# Patient Record
Sex: MALE | Race: WHITE | Employment: OTHER | URBAN - METROPOLITAN AREA
[De-identification: names, ages, dates, MRNs, and addresses within clinical notes are randomized per-mention and may not be internally consistent; named-entity substitution may affect disease eponyms.]

---

## 2018-11-01 ENCOUNTER — IP HISTORICAL/CONVERTED ENCOUNTER (OUTPATIENT)
Dept: OTHER | Age: 48
End: 2018-11-01

## 2019-03-15 ENCOUNTER — IP HISTORICAL/CONVERTED ENCOUNTER (OUTPATIENT)
Dept: OTHER | Age: 49
End: 2019-03-15

## 2019-08-04 ENCOUNTER — IP HISTORICAL/CONVERTED ENCOUNTER (OUTPATIENT)
Dept: OTHER | Age: 49
End: 2019-08-04

## 2019-10-22 ENCOUNTER — ED HISTORICAL/CONVERTED ENCOUNTER (OUTPATIENT)
Dept: OTHER | Age: 49
End: 2019-10-22

## 2019-12-26 ENCOUNTER — IP HISTORICAL/CONVERTED ENCOUNTER (OUTPATIENT)
Dept: OTHER | Age: 49
End: 2019-12-26

## 2020-01-01 ENCOUNTER — IP HISTORICAL/CONVERTED ENCOUNTER (OUTPATIENT)
Dept: OTHER | Age: 50
End: 2020-01-01

## 2021-01-01 ENCOUNTER — HOSPITAL ENCOUNTER (OUTPATIENT)
Age: 51
Setting detail: OBSERVATION
Discharge: NURSING, REHAB OR LONG-TERM CARE | End: 2021-11-17
Attending: EMERGENCY MEDICINE | Admitting: PSYCHIATRY & NEUROLOGY
Payer: MEDICAID

## 2021-01-01 ENCOUNTER — HOSPITAL ENCOUNTER (INPATIENT)
Age: 51
LOS: 3 days | Discharge: HOME | DRG: 754 | End: 2021-07-09
Attending: EMERGENCY MEDICINE | Admitting: PSYCHIATRY & NEUROLOGY
Payer: MEDICAID

## 2021-01-01 VITALS
WEIGHT: 180 LBS | OXYGEN SATURATION: 100 % | HEART RATE: 64 BPM | SYSTOLIC BLOOD PRESSURE: 130 MMHG | DIASTOLIC BLOOD PRESSURE: 90 MMHG | RESPIRATION RATE: 18 BRPM | TEMPERATURE: 98.2 F | BODY MASS INDEX: 28.93 KG/M2 | HEIGHT: 66 IN

## 2021-01-01 VITALS
RESPIRATION RATE: 18 BRPM | HEIGHT: 66 IN | HEART RATE: 90 BPM | OXYGEN SATURATION: 100 % | WEIGHT: 185 LBS | BODY MASS INDEX: 29.73 KG/M2 | TEMPERATURE: 97.5 F | DIASTOLIC BLOOD PRESSURE: 85 MMHG | SYSTOLIC BLOOD PRESSURE: 113 MMHG

## 2021-01-01 DIAGNOSIS — R45.851 SUICIDAL IDEATION: Primary | ICD-10-CM

## 2021-01-01 DIAGNOSIS — F32.9 MAJOR DEPRESSIVE DISORDER, REMISSION STATUS UNSPECIFIED, UNSPECIFIED WHETHER RECURRENT: Primary | ICD-10-CM

## 2021-01-01 LAB
ALBUMIN SERPL-MCNC: 3.6 G/DL (ref 3.5–5)
ALBUMIN SERPL-MCNC: 4.3 G/DL (ref 3.5–5)
ALBUMIN/GLOB SERPL: 1 {RATIO} (ref 1.1–2.2)
ALBUMIN/GLOB SERPL: 1 {RATIO} (ref 1.1–2.2)
ALP SERPL-CCNC: 81 U/L (ref 45–117)
ALP SERPL-CCNC: 89 U/L (ref 45–117)
ALT SERPL-CCNC: 25 U/L (ref 12–78)
ALT SERPL-CCNC: 27 U/L (ref 12–78)
AMPHET UR QL SCN: NEGATIVE
AMPHET UR QL SCN: POSITIVE
ANION GAP SERPL CALC-SCNC: 10 MMOL/L (ref 5–15)
ANION GAP SERPL CALC-SCNC: 11 MMOL/L (ref 5–15)
ANION GAP SERPL CALC-SCNC: 6 MMOL/L (ref 5–15)
APPEARANCE UR: CLEAR
APPEARANCE UR: CLEAR
AST SERPL W P-5'-P-CCNC: 29 U/L (ref 15–37)
AST SERPL W P-5'-P-CCNC: 36 U/L (ref 15–37)
BACTERIA SPEC CULT: NORMAL
BACTERIA URNS QL MICRO: NEGATIVE /HPF
BACTERIA URNS QL MICRO: NEGATIVE /HPF
BARBITURATES UR QL SCN: NEGATIVE
BARBITURATES UR QL SCN: NEGATIVE
BASOPHILS # BLD: 0.1 K/UL (ref 0–0.1)
BASOPHILS # BLD: 0.1 K/UL (ref 0–0.1)
BASOPHILS NFR BLD: 1 % (ref 0–1)
BASOPHILS NFR BLD: 1 % (ref 0–1)
BENZODIAZ UR QL: NEGATIVE
BENZODIAZ UR QL: NEGATIVE
BILIRUB SERPL-MCNC: 0.2 MG/DL (ref 0.2–1)
BILIRUB SERPL-MCNC: 0.3 MG/DL (ref 0.2–1)
BILIRUB UR QL: NEGATIVE
BILIRUB UR QL: NEGATIVE
BUN SERPL-MCNC: 5 MG/DL (ref 6–20)
BUN SERPL-MCNC: 7 MG/DL (ref 6–20)
BUN SERPL-MCNC: 8 MG/DL (ref 6–20)
BUN/CREAT SERPL: 12 (ref 12–20)
BUN/CREAT SERPL: 14 (ref 12–20)
BUN/CREAT SERPL: 8 (ref 12–20)
CA-I BLD-MCNC: 8.1 MG/DL (ref 8.5–10.1)
CA-I BLD-MCNC: 8.4 MG/DL (ref 8.5–10.1)
CA-I BLD-MCNC: 8.9 MG/DL (ref 8.5–10.1)
CANNABINOIDS UR QL SCN: NEGATIVE
CANNABINOIDS UR QL SCN: NEGATIVE
CHLORIDE SERPL-SCNC: 103 MMOL/L (ref 97–108)
CHLORIDE SERPL-SCNC: 107 MMOL/L (ref 97–108)
CHLORIDE SERPL-SCNC: 108 MMOL/L (ref 97–108)
CO2 SERPL-SCNC: 21 MMOL/L (ref 21–32)
CO2 SERPL-SCNC: 26 MMOL/L (ref 21–32)
CO2 SERPL-SCNC: 27 MMOL/L (ref 21–32)
COCAINE UR QL SCN: NEGATIVE
COCAINE UR QL SCN: NEGATIVE
COLOR UR: ABNORMAL
COLOR UR: ABNORMAL
CREAT SERPL-MCNC: 0.59 MG/DL (ref 0.7–1.3)
CREAT SERPL-MCNC: 0.6 MG/DL (ref 0.7–1.3)
CREAT SERPL-MCNC: 0.6 MG/DL (ref 0.7–1.3)
DIFFERENTIAL METHOD BLD: ABNORMAL
DIFFERENTIAL METHOD BLD: NORMAL
DRUG SCRN COMMENT,DRGCM: ABNORMAL
DRUG SCRN COMMENT,DRGCM: NORMAL
EOSINOPHIL # BLD: 0.1 K/UL (ref 0–0.4)
EOSINOPHIL # BLD: 0.1 K/UL (ref 0–0.4)
EOSINOPHIL NFR BLD: 1 % (ref 0–7)
EOSINOPHIL NFR BLD: 2 % (ref 0–7)
ERYTHROCYTE [DISTWIDTH] IN BLOOD BY AUTOMATED COUNT: 12 % (ref 11.5–14.5)
ERYTHROCYTE [DISTWIDTH] IN BLOOD BY AUTOMATED COUNT: 12.3 % (ref 11.5–14.5)
ETHANOL SERPL-MCNC: 187 MG/DL
ETHANOL SERPL-MCNC: 355 MG/DL
ETHANOL SERPL-MCNC: 4 MG/DL
ETHANOL SERPL-MCNC: 6 MG/DL
GLOBULIN SER CALC-MCNC: 3.7 G/DL (ref 2–4)
GLOBULIN SER CALC-MCNC: 4.1 G/DL (ref 2–4)
GLUCOSE SERPL-MCNC: 110 MG/DL (ref 65–100)
GLUCOSE SERPL-MCNC: 127 MG/DL (ref 65–100)
GLUCOSE SERPL-MCNC: 137 MG/DL (ref 65–100)
GLUCOSE UR STRIP.AUTO-MCNC: NEGATIVE MG/DL
GLUCOSE UR STRIP.AUTO-MCNC: NORMAL MG/DL
HCT VFR BLD AUTO: 46.4 % (ref 36.6–50.3)
HCT VFR BLD AUTO: 49.1 % (ref 36.6–50.3)
HGB BLD-MCNC: 16.4 G/DL (ref 12.1–17)
HGB BLD-MCNC: 17.4 G/DL (ref 12.1–17)
HGB UR QL STRIP: ABNORMAL
HGB UR QL STRIP: NEGATIVE
HYALINE CASTS URNS QL MICRO: ABNORMAL /LPF (ref 0–5)
IMM GRANULOCYTES # BLD AUTO: 0 K/UL (ref 0–0.04)
IMM GRANULOCYTES # BLD AUTO: 0 K/UL (ref 0–0.04)
IMM GRANULOCYTES NFR BLD AUTO: 0 % (ref 0–0.5)
IMM GRANULOCYTES NFR BLD AUTO: 0 % (ref 0–0.5)
KETONES UR QL STRIP.AUTO: 5 MG/DL
KETONES UR QL STRIP.AUTO: NEGATIVE MG/DL
LEUKOCYTE ESTERASE UR QL STRIP.AUTO: NEGATIVE
LEUKOCYTE ESTERASE UR QL STRIP.AUTO: NEGATIVE
LYMPHOCYTES # BLD: 2.5 K/UL (ref 0.8–3.5)
LYMPHOCYTES # BLD: 2.8 K/UL (ref 0.8–3.5)
LYMPHOCYTES NFR BLD: 40 % (ref 12–49)
LYMPHOCYTES NFR BLD: 46 % (ref 12–49)
MCH RBC QN AUTO: 33.7 PG (ref 26–34)
MCH RBC QN AUTO: 34 PG (ref 26–34)
MCHC RBC AUTO-ENTMCNC: 35.3 G/DL (ref 30–36.5)
MCHC RBC AUTO-ENTMCNC: 35.4 G/DL (ref 30–36.5)
MCV RBC AUTO: 95 FL (ref 80–99)
MCV RBC AUTO: 96.1 FL (ref 80–99)
METHADONE UR QL: NEGATIVE
METHADONE UR QL: NEGATIVE
MONOCYTES # BLD: 0.4 K/UL (ref 0–1)
MONOCYTES # BLD: 0.5 K/UL (ref 0–1)
MONOCYTES NFR BLD: 7 % (ref 5–13)
MONOCYTES NFR BLD: 7 % (ref 5–13)
MUCOUS THREADS URNS QL MICRO: ABNORMAL /LPF
MUCOUS THREADS URNS QL MICRO: ABNORMAL /LPF
NEUTS SEG # BLD: 2.7 K/UL (ref 1.8–8)
NEUTS SEG # BLD: 3.2 K/UL (ref 1.8–8)
NEUTS SEG NFR BLD: 45 % (ref 32–75)
NEUTS SEG NFR BLD: 50 % (ref 32–75)
NITRITE UR QL STRIP.AUTO: NEGATIVE
NITRITE UR QL STRIP.AUTO: NEGATIVE
NRBC # BLD: 0 K/UL (ref 0–0.01)
NRBC # BLD: 0 K/UL (ref 0–0.01)
NRBC BLD-RTO: 0 PER 100 WBC
NRBC BLD-RTO: 0 PER 100 WBC
OPIATES UR QL: NEGATIVE
OPIATES UR QL: NEGATIVE
PCP UR QL: NEGATIVE
PCP UR QL: NEGATIVE
PH UR STRIP: 5 [PH] (ref 5–8)
PH UR STRIP: 6 [PH] (ref 5–8)
PLATELET # BLD AUTO: 220 K/UL (ref 150–400)
PLATELET # BLD AUTO: 244 K/UL (ref 150–400)
PMV BLD AUTO: 9.1 FL (ref 8.9–12.9)
PMV BLD AUTO: 9.9 FL (ref 8.9–12.9)
POTASSIUM SERPL-SCNC: 3.3 MMOL/L (ref 3.5–5.1)
POTASSIUM SERPL-SCNC: 3.4 MMOL/L (ref 3.5–5.1)
POTASSIUM SERPL-SCNC: 3.8 MMOL/L (ref 3.5–5.1)
PROT SERPL-MCNC: 7.3 G/DL (ref 6.4–8.2)
PROT SERPL-MCNC: 8.4 G/DL (ref 6.4–8.2)
PROT UR STRIP-MCNC: 30 MG/DL
PROT UR STRIP-MCNC: NEGATIVE MG/DL
RBC # BLD AUTO: 4.83 M/UL (ref 4.1–5.7)
RBC # BLD AUTO: 5.17 M/UL (ref 4.1–5.7)
RBC #/AREA URNS HPF: 1 /HPF (ref 0–5)
RBC #/AREA URNS HPF: ABNORMAL /HPF (ref 0–5)
SARS-COV-2, COV2: NORMAL
SARS-COV-2, COV2: NOT DETECTED
SARS-COV-2, COV2: NOT DETECTED
SODIUM SERPL-SCNC: 139 MMOL/L (ref 136–145)
SODIUM SERPL-SCNC: 140 MMOL/L (ref 136–145)
SODIUM SERPL-SCNC: 140 MMOL/L (ref 136–145)
SP GR UR REFRACTOMETRY: 1.01 (ref 1–1.03)
SPECIAL REQUESTS,SREQ: NORMAL
UA: UC IF INDICATED,UAUC: ABNORMAL
UA: UC IF INDICATED,UAUC: ABNORMAL
UROBILINOGEN UR QL STRIP.AUTO: 0.1 EU/DL (ref 0.1–1)
UROBILINOGEN UR QL STRIP.AUTO: NORMAL EU/DL (ref 0.1–1)
WBC # BLD AUTO: 6 K/UL (ref 4.1–11.1)
WBC # BLD AUTO: 6.4 K/UL (ref 4.1–11.1)
WBC URNS QL MICRO: <1 /HPF (ref 0–4)
WBC URNS QL MICRO: ABNORMAL /HPF (ref 0–4)

## 2021-01-01 PROCEDURE — 87086 URINE CULTURE/COLONY COUNT: CPT

## 2021-01-01 PROCEDURE — G0378 HOSPITAL OBSERVATION PER HR: HCPCS

## 2021-01-01 PROCEDURE — 80053 COMPREHEN METABOLIC PANEL: CPT

## 2021-01-01 PROCEDURE — 65220000003 HC RM SEMIPRIVATE PSYCH

## 2021-01-01 PROCEDURE — 87635 SARS-COV-2 COVID-19 AMP PRB: CPT

## 2021-01-01 PROCEDURE — 74011250637 HC RX REV CODE- 250/637: Performed by: INTERNAL MEDICINE

## 2021-01-01 PROCEDURE — 82077 ASSAY SPEC XCP UR&BREATH IA: CPT

## 2021-01-01 PROCEDURE — 74011250637 HC RX REV CODE- 250/637: Performed by: PSYCHIATRY & NEUROLOGY

## 2021-01-01 PROCEDURE — 96375 TX/PRO/DX INJ NEW DRUG ADDON: CPT

## 2021-01-01 PROCEDURE — 96361 HYDRATE IV INFUSION ADD-ON: CPT

## 2021-01-01 PROCEDURE — 74011250636 HC RX REV CODE- 250/636: Performed by: EMERGENCY MEDICINE

## 2021-01-01 PROCEDURE — 74011250637 HC RX REV CODE- 250/637: Performed by: EMERGENCY MEDICINE

## 2021-01-01 PROCEDURE — 85025 COMPLETE CBC W/AUTO DIFF WBC: CPT

## 2021-01-01 PROCEDURE — 99285 EMERGENCY DEPT VISIT HI MDM: CPT

## 2021-01-01 PROCEDURE — 36415 COLL VENOUS BLD VENIPUNCTURE: CPT

## 2021-01-01 PROCEDURE — 80307 DRUG TEST PRSMV CHEM ANLYZR: CPT

## 2021-01-01 PROCEDURE — 74011250637 HC RX REV CODE- 250/637: Performed by: STUDENT IN AN ORGANIZED HEALTH CARE EDUCATION/TRAINING PROGRAM

## 2021-01-01 PROCEDURE — 81001 URINALYSIS AUTO W/SCOPE: CPT

## 2021-01-01 PROCEDURE — 96374 THER/PROPH/DIAG INJ IV PUSH: CPT

## 2021-01-01 PROCEDURE — 80048 BASIC METABOLIC PNL TOTAL CA: CPT

## 2021-01-01 RX ORDER — BUPROPION HYDROCHLORIDE 100 MG/1
100 TABLET, EXTENDED RELEASE ORAL
Qty: 30 TABLET | Refills: 0 | Status: SHIPPED | OUTPATIENT
Start: 2021-01-01

## 2021-01-01 RX ORDER — IBUPROFEN 200 MG
1 TABLET ORAL DAILY
Qty: 30 PATCH | Refills: 0 | Status: SHIPPED | OUTPATIENT
Start: 2021-01-01 | End: 2021-01-01

## 2021-01-01 RX ORDER — CHLORDIAZEPOXIDE HYDROCHLORIDE 5 MG/1
20 CAPSULE, GELATIN COATED ORAL
Status: DISCONTINUED | OUTPATIENT
Start: 2021-01-01 | End: 2021-01-01 | Stop reason: HOSPADM

## 2021-01-01 RX ORDER — AMLODIPINE BESYLATE 5 MG/1
2.5 TABLET ORAL DAILY
Status: DISCONTINUED | OUTPATIENT
Start: 2021-01-01 | End: 2021-01-01 | Stop reason: HOSPADM

## 2021-01-01 RX ORDER — POTASSIUM CHLORIDE 750 MG/1
40 TABLET, FILM COATED, EXTENDED RELEASE ORAL
Status: COMPLETED | OUTPATIENT
Start: 2021-01-01 | End: 2021-01-01

## 2021-01-01 RX ORDER — HYDRALAZINE HYDROCHLORIDE 25 MG/1
50 TABLET, FILM COATED ORAL
Status: COMPLETED | OUTPATIENT
Start: 2021-01-01 | End: 2021-01-01

## 2021-01-01 RX ORDER — METOPROLOL SUCCINATE 50 MG/1
50 TABLET, EXTENDED RELEASE ORAL DAILY
Status: DISCONTINUED | OUTPATIENT
Start: 2021-01-01 | End: 2021-01-01

## 2021-01-01 RX ORDER — CHLORDIAZEPOXIDE HYDROCHLORIDE 10 MG/1
20 CAPSULE, GELATIN COATED ORAL 3 TIMES DAILY
Status: DISCONTINUED | OUTPATIENT
Start: 2021-01-01 | End: 2021-01-01

## 2021-01-01 RX ORDER — IBUPROFEN 400 MG/1
400 TABLET ORAL
Status: DISCONTINUED | OUTPATIENT
Start: 2021-01-01 | End: 2021-01-01 | Stop reason: HOSPADM

## 2021-01-01 RX ORDER — LORAZEPAM 1 MG/1
1 TABLET ORAL
Status: DISCONTINUED | OUTPATIENT
Start: 2021-01-01 | End: 2021-01-01

## 2021-01-01 RX ORDER — AMLODIPINE BESYLATE 2.5 MG/1
2.5 TABLET ORAL DAILY
Qty: 30 TABLET | Refills: 0 | Status: SHIPPED | OUTPATIENT
Start: 2021-01-01

## 2021-01-01 RX ORDER — CHLORDIAZEPOXIDE HYDROCHLORIDE 25 MG/1
25 CAPSULE, GELATIN COATED ORAL 4 TIMES DAILY
Status: DISCONTINUED | OUTPATIENT
Start: 2021-01-01 | End: 2021-01-01

## 2021-01-01 RX ORDER — LANOLIN ALCOHOL/MO/W.PET/CERES
3 CREAM (GRAM) TOPICAL
Qty: 30 TABLET | Refills: 0 | Status: SHIPPED | OUTPATIENT
Start: 2021-01-01

## 2021-01-01 RX ORDER — LOSARTAN POTASSIUM 50 MG/1
50 TABLET ORAL DAILY
Status: DISCONTINUED | OUTPATIENT
Start: 2021-01-01 | End: 2021-01-01

## 2021-01-01 RX ORDER — LANOLIN ALCOHOL/MO/W.PET/CERES
3 CREAM (GRAM) TOPICAL
Status: DISCONTINUED | OUTPATIENT
Start: 2021-01-01 | End: 2021-01-01 | Stop reason: HOSPADM

## 2021-01-01 RX ORDER — ASPIRIN 325 MG/1
100 TABLET, FILM COATED ORAL DAILY
Status: DISCONTINUED | OUTPATIENT
Start: 2021-01-01 | End: 2021-01-01 | Stop reason: HOSPADM

## 2021-01-01 RX ORDER — HYDROXYZINE 50 MG/1
50 TABLET, FILM COATED ORAL
Status: DISCONTINUED | OUTPATIENT
Start: 2021-01-01 | End: 2021-01-01 | Stop reason: HOSPADM

## 2021-01-01 RX ORDER — METOPROLOL SUCCINATE 25 MG/1
25 TABLET, EXTENDED RELEASE ORAL DAILY
Status: DISCONTINUED | OUTPATIENT
Start: 2021-01-01 | End: 2021-01-01 | Stop reason: HOSPADM

## 2021-01-01 RX ORDER — PANTOPRAZOLE SODIUM 40 MG/1
40 TABLET, DELAYED RELEASE ORAL
Qty: 30 TABLET | Refills: 0 | Status: SHIPPED | OUTPATIENT
Start: 2021-01-01

## 2021-01-01 RX ORDER — CHLORDIAZEPOXIDE HYDROCHLORIDE 10 MG/1
20 CAPSULE, GELATIN COATED ORAL 4 TIMES DAILY
Status: DISCONTINUED | OUTPATIENT
Start: 2021-01-01 | End: 2021-01-01

## 2021-01-01 RX ORDER — LORAZEPAM 2 MG/ML
1 INJECTION INTRAMUSCULAR
Status: COMPLETED | OUTPATIENT
Start: 2021-01-01 | End: 2021-01-01

## 2021-01-01 RX ORDER — CHLORDIAZEPOXIDE HYDROCHLORIDE 5 MG/1
5 CAPSULE, GELATIN COATED ORAL
Status: DISCONTINUED | OUTPATIENT
Start: 2021-01-01 | End: 2021-01-01 | Stop reason: HOSPADM

## 2021-01-01 RX ORDER — CHLORDIAZEPOXIDE HYDROCHLORIDE 5 MG/1
5 CAPSULE, GELATIN COATED ORAL 4 TIMES DAILY
Status: DISCONTINUED | OUTPATIENT
Start: 2021-01-01 | End: 2021-01-01

## 2021-01-01 RX ORDER — OLANZAPINE 5 MG/1
5 TABLET ORAL
Status: DISCONTINUED | OUTPATIENT
Start: 2021-01-01 | End: 2021-01-01 | Stop reason: HOSPADM

## 2021-01-01 RX ORDER — IBUPROFEN 600 MG/1
600 TABLET ORAL
Qty: 30 TABLET | Refills: 0 | Status: SHIPPED | OUTPATIENT
Start: 2021-01-01

## 2021-01-01 RX ORDER — ADHESIVE BANDAGE
30 BANDAGE TOPICAL DAILY PRN
Status: DISCONTINUED | OUTPATIENT
Start: 2021-01-01 | End: 2021-01-01 | Stop reason: HOSPADM

## 2021-01-01 RX ORDER — HALOPERIDOL 5 MG/ML
5 INJECTION INTRAMUSCULAR
Status: DISCONTINUED | OUTPATIENT
Start: 2021-01-01 | End: 2021-01-01 | Stop reason: HOSPADM

## 2021-01-01 RX ORDER — TOPIRAMATE 100 MG/1
100 TABLET, FILM COATED ORAL
Status: DISCONTINUED | OUTPATIENT
Start: 2021-01-01 | End: 2021-01-01 | Stop reason: HOSPADM

## 2021-01-01 RX ORDER — FOLIC ACID 1 MG/1
1 TABLET ORAL DAILY
Status: DISCONTINUED | OUTPATIENT
Start: 2021-01-01 | End: 2021-01-01 | Stop reason: HOSPADM

## 2021-01-01 RX ORDER — CHLORDIAZEPOXIDE HYDROCHLORIDE 10 MG/1
20 CAPSULE, GELATIN COATED ORAL
Status: DISCONTINUED | OUTPATIENT
Start: 2021-01-01 | End: 2021-01-01

## 2021-01-01 RX ORDER — ONDANSETRON 2 MG/ML
4 INJECTION INTRAMUSCULAR; INTRAVENOUS ONCE
Status: COMPLETED | OUTPATIENT
Start: 2021-01-01 | End: 2021-01-01

## 2021-01-01 RX ORDER — IBUPROFEN 600 MG/1
600 TABLET ORAL
Status: DISCONTINUED | OUTPATIENT
Start: 2021-01-01 | End: 2021-01-01 | Stop reason: HOSPADM

## 2021-01-01 RX ORDER — IBUPROFEN 200 MG
1 TABLET ORAL DAILY
Status: DISCONTINUED | OUTPATIENT
Start: 2021-01-01 | End: 2021-01-01

## 2021-01-01 RX ORDER — SERTRALINE HYDROCHLORIDE 50 MG/1
50 TABLET, FILM COATED ORAL DAILY
Status: DISCONTINUED | OUTPATIENT
Start: 2021-01-01 | End: 2021-01-01

## 2021-01-01 RX ORDER — HYDROXYZINE 25 MG/1
50 TABLET, FILM COATED ORAL ONCE
Status: COMPLETED | OUTPATIENT
Start: 2021-01-01 | End: 2021-01-01

## 2021-01-01 RX ORDER — FLUTICASONE PROPIONATE 50 MCG
2 SPRAY, SUSPENSION (ML) NASAL DAILY
Status: DISCONTINUED | OUTPATIENT
Start: 2021-01-01 | End: 2021-01-01 | Stop reason: HOSPADM

## 2021-01-01 RX ORDER — ACETAMINOPHEN 325 MG/1
650 TABLET ORAL
Status: DISCONTINUED | OUTPATIENT
Start: 2021-01-01 | End: 2021-01-01 | Stop reason: HOSPADM

## 2021-01-01 RX ORDER — ALBUTEROL SULFATE 90 UG/1
2 AEROSOL, METERED RESPIRATORY (INHALATION)
Status: DISCONTINUED | OUTPATIENT
Start: 2021-01-01 | End: 2021-01-01 | Stop reason: HOSPADM

## 2021-01-01 RX ORDER — TRAZODONE HYDROCHLORIDE 50 MG/1
100 TABLET ORAL
Status: DISCONTINUED | OUTPATIENT
Start: 2021-01-01 | End: 2021-01-01

## 2021-01-01 RX ORDER — PSEUDOEPHEDRINE HCL 30 MG
30 TABLET ORAL
Status: DISCONTINUED | OUTPATIENT
Start: 2021-01-01 | End: 2021-01-01 | Stop reason: HOSPADM

## 2021-01-01 RX ORDER — TRAZODONE HYDROCHLORIDE 50 MG/1
50 TABLET ORAL
Status: DISCONTINUED | OUTPATIENT
Start: 2021-01-01 | End: 2021-01-01 | Stop reason: HOSPADM

## 2021-01-01 RX ORDER — BUSPIRONE HYDROCHLORIDE 15 MG/1
15 TABLET ORAL 3 TIMES DAILY
Qty: 90 TABLET | Refills: 0 | Status: SHIPPED | OUTPATIENT
Start: 2021-01-01

## 2021-01-01 RX ORDER — IBUPROFEN 200 MG
1 TABLET ORAL DAILY
Status: DISCONTINUED | OUTPATIENT
Start: 2021-01-01 | End: 2021-01-01 | Stop reason: HOSPADM

## 2021-01-01 RX ORDER — CHLORDIAZEPOXIDE HYDROCHLORIDE 25 MG/1
25 CAPSULE, GELATIN COATED ORAL
Status: DISCONTINUED | OUTPATIENT
Start: 2021-01-01 | End: 2021-01-01

## 2021-01-01 RX ORDER — LOPERAMIDE HYDROCHLORIDE 2 MG/1
4 CAPSULE ORAL
Status: DISCONTINUED | OUTPATIENT
Start: 2021-01-01 | End: 2021-01-01 | Stop reason: HOSPADM

## 2021-01-01 RX ORDER — PANTOPRAZOLE SODIUM 40 MG/1
40 TABLET, DELAYED RELEASE ORAL
Status: DISCONTINUED | OUTPATIENT
Start: 2021-01-01 | End: 2021-01-01 | Stop reason: HOSPADM

## 2021-01-01 RX ORDER — METOPROLOL SUCCINATE 25 MG/1
25 TABLET, EXTENDED RELEASE ORAL DAILY
Qty: 30 TABLET | Refills: 0 | Status: SHIPPED | OUTPATIENT
Start: 2021-01-01

## 2021-01-01 RX ORDER — LOSARTAN POTASSIUM 50 MG/1
100 TABLET ORAL DAILY
Status: DISCONTINUED | OUTPATIENT
Start: 2021-01-01 | End: 2021-01-01 | Stop reason: HOSPADM

## 2021-01-01 RX ORDER — MAG HYDROX/ALUMINUM HYD/SIMETH 200-200-20
30 SUSPENSION, ORAL (FINAL DOSE FORM) ORAL
Status: DISCONTINUED | OUTPATIENT
Start: 2021-01-01 | End: 2021-01-01 | Stop reason: HOSPADM

## 2021-01-01 RX ORDER — BUPROPION HYDROCHLORIDE 150 MG/1
150 TABLET ORAL
Status: DISCONTINUED | OUTPATIENT
Start: 2021-01-01 | End: 2021-01-01 | Stop reason: HOSPADM

## 2021-01-01 RX ORDER — ONDANSETRON 4 MG/1
4 TABLET, ORALLY DISINTEGRATING ORAL
Status: COMPLETED | OUTPATIENT
Start: 2021-01-01 | End: 2021-01-01

## 2021-01-01 RX ORDER — ONDANSETRON 4 MG/1
4 TABLET, ORALLY DISINTEGRATING ORAL
Status: DISCONTINUED | OUTPATIENT
Start: 2021-01-01 | End: 2021-01-01 | Stop reason: HOSPADM

## 2021-01-01 RX ORDER — BUPROPION HYDROCHLORIDE 100 MG/1
100 TABLET, EXTENDED RELEASE ORAL
Status: DISCONTINUED | OUTPATIENT
Start: 2021-01-01 | End: 2021-01-01 | Stop reason: HOSPADM

## 2021-01-01 RX ADMIN — TOPIRAMATE 100 MG: 100 TABLET, FILM COATED ORAL at 21:10

## 2021-01-01 RX ADMIN — BUSPIRONE HYDROCHLORIDE 15 MG: 10 TABLET ORAL at 15:50

## 2021-01-01 RX ADMIN — MELATONIN TAB 3 MG 3 MG: 3 TAB at 21:17

## 2021-01-01 RX ADMIN — PANTOPRAZOLE SODIUM 40 MG: 40 TABLET, DELAYED RELEASE ORAL at 07:42

## 2021-01-01 RX ADMIN — BUSPIRONE HYDROCHLORIDE 15 MG: 10 TABLET ORAL at 16:42

## 2021-01-01 RX ADMIN — BUSPIRONE HYDROCHLORIDE 15 MG: 10 TABLET ORAL at 21:07

## 2021-01-01 RX ADMIN — THIAMINE HCL TAB 100 MG 100 MG: 100 TAB at 12:08

## 2021-01-01 RX ADMIN — PANTOPRAZOLE SODIUM 40 MG: 40 TABLET, DELAYED RELEASE ORAL at 12:08

## 2021-01-01 RX ADMIN — PANTOPRAZOLE SODIUM 40 MG: 40 TABLET, DELAYED RELEASE ORAL at 08:08

## 2021-01-01 RX ADMIN — BUSPIRONE HYDROCHLORIDE 15 MG: 10 TABLET ORAL at 16:44

## 2021-01-01 RX ADMIN — PANTOPRAZOLE SODIUM 40 MG: 40 TABLET, DELAYED RELEASE ORAL at 07:58

## 2021-01-01 RX ADMIN — CHLORDIAZEPOXIDE HYDROCHLORIDE 5 MG: 5 CAPSULE ORAL at 18:10

## 2021-01-01 RX ADMIN — THIAMINE HCL TAB 100 MG 100 MG: 100 TAB at 09:33

## 2021-01-01 RX ADMIN — PANTOPRAZOLE SODIUM 40 MG: 40 TABLET, DELAYED RELEASE ORAL at 08:47

## 2021-01-01 RX ADMIN — BUSPIRONE HYDROCHLORIDE 15 MG: 10 TABLET ORAL at 21:10

## 2021-01-01 RX ADMIN — BUSPIRONE HYDROCHLORIDE 15 MG: 10 TABLET ORAL at 09:17

## 2021-01-01 RX ADMIN — BUSPIRONE HYDROCHLORIDE 15 MG: 10 TABLET ORAL at 21:16

## 2021-01-01 RX ADMIN — FOLIC ACID 1 MG: 1 TABLET ORAL at 08:08

## 2021-01-01 RX ADMIN — FOLIC ACID 1 MG: 1 TABLET ORAL at 08:39

## 2021-01-01 RX ADMIN — FLUTICASONE PROPIONATE 2 SPRAY: 50 SPRAY, METERED NASAL at 08:40

## 2021-01-01 RX ADMIN — CHLORDIAZEPOXIDE HYDROCHLORIDE 20 MG: 10 CAPSULE ORAL at 14:00

## 2021-01-01 RX ADMIN — TOPIRAMATE 100 MG: 100 TABLET, FILM COATED ORAL at 08:47

## 2021-01-01 RX ADMIN — BUSPIRONE HYDROCHLORIDE 15 MG: 10 TABLET ORAL at 08:49

## 2021-01-01 RX ADMIN — THIAMINE HCL TAB 100 MG 100 MG: 100 TAB at 09:17

## 2021-01-01 RX ADMIN — FOLIC ACID 1 MG: 1 TABLET ORAL at 08:33

## 2021-01-01 RX ADMIN — MELATONIN TAB 3 MG 3 MG: 3 TAB at 21:09

## 2021-01-01 RX ADMIN — FOLIC ACID 1 MG: 1 TABLET ORAL at 08:29

## 2021-01-01 RX ADMIN — BUSPIRONE HYDROCHLORIDE 15 MG: 10 TABLET ORAL at 21:08

## 2021-01-01 RX ADMIN — CHLORDIAZEPOXIDE HYDROCHLORIDE 25 MG: 25 CAPSULE ORAL at 12:26

## 2021-01-01 RX ADMIN — CHLORDIAZEPOXIDE HYDROCHLORIDE 5 MG: 5 CAPSULE ORAL at 22:00

## 2021-01-01 RX ADMIN — PANTOPRAZOLE SODIUM 40 MG: 40 TABLET, DELAYED RELEASE ORAL at 09:32

## 2021-01-01 RX ADMIN — BUPROPION HYDROCHLORIDE 100 MG: 100 TABLET, FILM COATED, EXTENDED RELEASE ORAL at 08:29

## 2021-01-01 RX ADMIN — BUSPIRONE HYDROCHLORIDE 15 MG: 10 TABLET ORAL at 21:13

## 2021-01-01 RX ADMIN — FOLIC ACID 1 MG: 1 TABLET ORAL at 08:49

## 2021-01-01 RX ADMIN — LOPERAMIDE HYDROCHLORIDE 4 MG: 2 CAPSULE ORAL at 08:07

## 2021-01-01 RX ADMIN — BUSPIRONE HYDROCHLORIDE 15 MG: 10 TABLET ORAL at 21:22

## 2021-01-01 RX ADMIN — CHLORDIAZEPOXIDE HYDROCHLORIDE 25 MG: 25 CAPSULE ORAL at 08:29

## 2021-01-01 RX ADMIN — BUSPIRONE HYDROCHLORIDE 15 MG: 10 TABLET ORAL at 21:01

## 2021-01-01 RX ADMIN — METOPROLOL SUCCINATE 50 MG: 50 TABLET, EXTENDED RELEASE ORAL at 08:39

## 2021-01-01 RX ADMIN — HYDROXYZINE HYDROCHLORIDE 50 MG: 25 TABLET, FILM COATED ORAL at 01:02

## 2021-01-01 RX ADMIN — LOSARTAN POTASSIUM 100 MG: 50 TABLET, FILM COATED ORAL at 18:33

## 2021-01-01 RX ADMIN — PANTOPRAZOLE SODIUM 40 MG: 40 TABLET, DELAYED RELEASE ORAL at 08:39

## 2021-01-01 RX ADMIN — FLUTICASONE PROPIONATE 2 SPRAY: 50 SPRAY, METERED NASAL at 09:32

## 2021-01-01 RX ADMIN — BUSPIRONE HYDROCHLORIDE 15 MG: 10 TABLET ORAL at 16:26

## 2021-01-01 RX ADMIN — CHLORDIAZEPOXIDE HYDROCHLORIDE 25 MG: 25 CAPSULE ORAL at 21:10

## 2021-01-01 RX ADMIN — BUPROPION HYDROCHLORIDE 100 MG: 100 TABLET, FILM COATED, EXTENDED RELEASE ORAL at 07:42

## 2021-01-01 RX ADMIN — LOSARTAN POTASSIUM 50 MG: 50 TABLET, FILM COATED ORAL at 08:29

## 2021-01-01 RX ADMIN — CHLORDIAZEPOXIDE HYDROCHLORIDE 25 MG: 25 CAPSULE ORAL at 08:45

## 2021-01-01 RX ADMIN — CHLORDIAZEPOXIDE HYDROCHLORIDE 25 MG: 25 CAPSULE ORAL at 18:33

## 2021-01-01 RX ADMIN — PANTOPRAZOLE SODIUM 40 MG: 40 TABLET, DELAYED RELEASE ORAL at 08:43

## 2021-01-01 RX ADMIN — POTASSIUM CHLORIDE 40 MEQ: 750 TABLET, FILM COATED, EXTENDED RELEASE ORAL at 18:33

## 2021-01-01 RX ADMIN — TRAZODONE HYDROCHLORIDE 50 MG: 50 TABLET ORAL at 21:36

## 2021-01-01 RX ADMIN — BUSPIRONE HYDROCHLORIDE 15 MG: 10 TABLET ORAL at 09:31

## 2021-01-01 RX ADMIN — THIAMINE HCL TAB 100 MG 100 MG: 100 TAB at 08:39

## 2021-01-01 RX ADMIN — THIAMINE HCL TAB 100 MG 100 MG: 100 TAB at 09:32

## 2021-01-01 RX ADMIN — TOPIRAMATE 100 MG: 100 TABLET, FILM COATED ORAL at 21:22

## 2021-01-01 RX ADMIN — CHLORDIAZEPOXIDE HYDROCHLORIDE 15 MG: 5 CAPSULE ORAL at 22:46

## 2021-01-01 RX ADMIN — FOLIC ACID 1 MG: 1 TABLET ORAL at 08:38

## 2021-01-01 RX ADMIN — CHLORDIAZEPOXIDE HYDROCHLORIDE 25 MG: 25 CAPSULE ORAL at 17:56

## 2021-01-01 RX ADMIN — PANTOPRAZOLE SODIUM 40 MG: 40 TABLET, DELAYED RELEASE ORAL at 08:45

## 2021-01-01 RX ADMIN — FLUTICASONE PROPIONATE 2 SPRAY: 50 SPRAY, METERED NASAL at 08:51

## 2021-01-01 RX ADMIN — CHLORDIAZEPOXIDE HYDROCHLORIDE 20 MG: 10 CAPSULE ORAL at 21:25

## 2021-01-01 RX ADMIN — CHLORDIAZEPOXIDE HYDROCHLORIDE: 10 CAPSULE ORAL at 21:32

## 2021-01-01 RX ADMIN — THIAMINE HCL TAB 100 MG 100 MG: 100 TAB at 08:38

## 2021-01-01 RX ADMIN — AMLODIPINE BESYLATE 2.5 MG: 5 TABLET ORAL at 08:48

## 2021-01-01 RX ADMIN — BUSPIRONE HYDROCHLORIDE 15 MG: 10 TABLET ORAL at 21:17

## 2021-01-01 RX ADMIN — BUSPIRONE HYDROCHLORIDE 15 MG: 10 TABLET ORAL at 08:48

## 2021-01-01 RX ADMIN — LOSARTAN POTASSIUM 100 MG: 50 TABLET, FILM COATED ORAL at 08:45

## 2021-01-01 RX ADMIN — CHLORDIAZEPOXIDE HYDROCHLORIDE 25 MG: 25 CAPSULE ORAL at 15:13

## 2021-01-01 RX ADMIN — HYDRALAZINE HYDROCHLORIDE 50 MG: 25 TABLET, FILM COATED ORAL at 09:34

## 2021-01-01 RX ADMIN — PANTOPRAZOLE SODIUM 40 MG: 40 TABLET, DELAYED RELEASE ORAL at 08:06

## 2021-01-01 RX ADMIN — PANTOPRAZOLE SODIUM 40 MG: 40 TABLET, DELAYED RELEASE ORAL at 07:54

## 2021-01-01 RX ADMIN — CHLORDIAZEPOXIDE HYDROCHLORIDE 20 MG: 10 CAPSULE ORAL at 18:10

## 2021-01-01 RX ADMIN — FLUTICASONE PROPIONATE 2 SPRAY: 50 SPRAY, METERED NASAL at 09:30

## 2021-01-01 RX ADMIN — SERTRALINE HYDROCHLORIDE 50 MG: 50 TABLET ORAL at 08:54

## 2021-01-01 RX ADMIN — CHLORDIAZEPOXIDE HYDROCHLORIDE 5 MG: 5 CAPSULE ORAL at 08:44

## 2021-01-01 RX ADMIN — BUSPIRONE HYDROCHLORIDE 15 MG: 10 TABLET ORAL at 16:54

## 2021-01-01 RX ADMIN — TRAZODONE HYDROCHLORIDE 100 MG: 50 TABLET ORAL at 21:10

## 2021-01-01 RX ADMIN — BUSPIRONE HYDROCHLORIDE 15 MG: 10 TABLET ORAL at 16:20

## 2021-01-01 RX ADMIN — BUPROPION HYDROCHLORIDE 100 MG: 100 TABLET, FILM COATED, EXTENDED RELEASE ORAL at 08:32

## 2021-01-01 RX ADMIN — PANTOPRAZOLE SODIUM 40 MG: 40 TABLET, DELAYED RELEASE ORAL at 07:55

## 2021-01-01 RX ADMIN — MELATONIN TAB 3 MG 3 MG: 3 TAB at 21:22

## 2021-01-01 RX ADMIN — MELATONIN TAB 3 MG 3 MG: 3 TAB at 22:15

## 2021-01-01 RX ADMIN — LORAZEPAM 1 MG: 1 TABLET ORAL at 21:15

## 2021-01-01 RX ADMIN — CHLORDIAZEPOXIDE HYDROCHLORIDE 25 MG: 25 CAPSULE ORAL at 12:08

## 2021-01-01 RX ADMIN — BUSPIRONE HYDROCHLORIDE 15 MG: 10 TABLET ORAL at 15:58

## 2021-01-01 RX ADMIN — METOPROLOL SUCCINATE 50 MG: 50 TABLET, EXTENDED RELEASE ORAL at 08:29

## 2021-01-01 RX ADMIN — MELATONIN TAB 3 MG 3 MG: 3 TAB at 21:03

## 2021-01-01 RX ADMIN — LOSARTAN POTASSIUM 100 MG: 50 TABLET, FILM COATED ORAL at 08:43

## 2021-01-01 RX ADMIN — BUSPIRONE HYDROCHLORIDE 15 MG: 10 TABLET ORAL at 15:37

## 2021-01-01 RX ADMIN — AMLODIPINE BESYLATE 2.5 MG: 5 TABLET ORAL at 08:39

## 2021-01-01 RX ADMIN — FOLIC ACID 1 MG: 1 TABLET ORAL at 12:08

## 2021-01-01 RX ADMIN — CHLORDIAZEPOXIDE HYDROCHLORIDE 15 MG: 5 CAPSULE ORAL at 06:17

## 2021-01-01 RX ADMIN — BUPROPION HYDROCHLORIDE 100 MG: 100 TABLET, FILM COATED, EXTENDED RELEASE ORAL at 07:57

## 2021-01-01 RX ADMIN — CHLORDIAZEPOXIDE HYDROCHLORIDE 25 MG: 25 CAPSULE ORAL at 12:43

## 2021-01-01 RX ADMIN — BUSPIRONE HYDROCHLORIDE 15 MG: 10 TABLET ORAL at 08:50

## 2021-01-01 RX ADMIN — BUSPIRONE HYDROCHLORIDE 15 MG: 10 TABLET ORAL at 08:08

## 2021-01-01 RX ADMIN — FOLIC ACID 1 MG: 1 TABLET ORAL at 09:32

## 2021-01-01 RX ADMIN — THIAMINE HCL TAB 100 MG 100 MG: 100 TAB at 08:33

## 2021-01-01 RX ADMIN — THIAMINE HCL TAB 100 MG 100 MG: 100 TAB at 09:00

## 2021-01-01 RX ADMIN — PANTOPRAZOLE SODIUM 40 MG: 40 TABLET, DELAYED RELEASE ORAL at 08:50

## 2021-01-01 RX ADMIN — MELATONIN TAB 3 MG 3 MG: 3 TAB at 21:16

## 2021-01-01 RX ADMIN — BUSPIRONE HYDROCHLORIDE 15 MG: 10 TABLET ORAL at 16:55

## 2021-01-01 RX ADMIN — TOPIRAMATE 100 MG: 100 TABLET, FILM COATED ORAL at 08:48

## 2021-01-01 RX ADMIN — SODIUM CHLORIDE 1000 ML: 9 INJECTION, SOLUTION INTRAVENOUS at 22:20

## 2021-01-01 RX ADMIN — METOPROLOL SUCCINATE 50 MG: 50 TABLET, EXTENDED RELEASE ORAL at 08:08

## 2021-01-01 RX ADMIN — MELATONIN TAB 3 MG 3 MG: 3 TAB at 21:07

## 2021-01-01 RX ADMIN — BUSPIRONE HYDROCHLORIDE 15 MG: 10 TABLET ORAL at 08:29

## 2021-01-01 RX ADMIN — THIAMINE HCL TAB 100 MG 100 MG: 100 TAB at 08:48

## 2021-01-01 RX ADMIN — FLUTICASONE PROPIONATE 2 SPRAY: 50 SPRAY, METERED NASAL at 08:36

## 2021-01-01 RX ADMIN — LOPERAMIDE HYDROCHLORIDE 4 MG: 2 CAPSULE ORAL at 17:40

## 2021-01-01 RX ADMIN — FLUTICASONE PROPIONATE 2 SPRAY: 50 SPRAY, METERED NASAL at 09:28

## 2021-01-01 RX ADMIN — PANTOPRAZOLE SODIUM 40 MG: 40 TABLET, DELAYED RELEASE ORAL at 07:46

## 2021-01-01 RX ADMIN — CHLORDIAZEPOXIDE HYDROCHLORIDE 5 MG: 5 CAPSULE ORAL at 08:55

## 2021-01-01 RX ADMIN — BUPROPION HYDROCHLORIDE 150 MG: 150 TABLET, FILM COATED, EXTENDED RELEASE ORAL at 06:40

## 2021-01-01 RX ADMIN — MELATONIN TAB 3 MG 3 MG: 3 TAB at 21:01

## 2021-01-01 RX ADMIN — LORAZEPAM 1 MG: 2 INJECTION INTRAMUSCULAR; INTRAVENOUS at 07:51

## 2021-01-01 RX ADMIN — ONDANSETRON 4 MG: 2 INJECTION INTRAMUSCULAR; INTRAVENOUS at 07:51

## 2021-01-01 RX ADMIN — BUPROPION HYDROCHLORIDE 100 MG: 100 TABLET, FILM COATED, EXTENDED RELEASE ORAL at 08:08

## 2021-01-01 RX ADMIN — BUSPIRONE HYDROCHLORIDE 15 MG: 10 TABLET ORAL at 22:15

## 2021-01-01 RX ADMIN — BUSPIRONE HYDROCHLORIDE 15 MG: 10 TABLET ORAL at 08:39

## 2021-01-01 RX ADMIN — PANTOPRAZOLE SODIUM 40 MG: 40 TABLET, DELAYED RELEASE ORAL at 18:35

## 2021-01-01 RX ADMIN — BUSPIRONE HYDROCHLORIDE 15 MG: 10 TABLET ORAL at 16:30

## 2021-01-01 RX ADMIN — TRAZODONE HYDROCHLORIDE 50 MG: 50 TABLET ORAL at 21:26

## 2021-01-01 RX ADMIN — METOPROLOL SUCCINATE 25 MG: 25 TABLET, EXTENDED RELEASE ORAL at 08:33

## 2021-01-01 RX ADMIN — BUPROPION HYDROCHLORIDE 100 MG: 100 TABLET, FILM COATED, EXTENDED RELEASE ORAL at 07:46

## 2021-01-01 RX ADMIN — BUPROPION HYDROCHLORIDE 100 MG: 100 TABLET, FILM COATED, EXTENDED RELEASE ORAL at 08:50

## 2021-01-01 RX ADMIN — FLUTICASONE PROPIONATE 2 SPRAY: 50 SPRAY, METERED NASAL at 08:48

## 2021-01-01 RX ADMIN — BUSPIRONE HYDROCHLORIDE 15 MG: 10 TABLET ORAL at 21:03

## 2021-01-01 RX ADMIN — CHLORDIAZEPOXIDE HYDROCHLORIDE 25 MG: 25 CAPSULE ORAL at 18:09

## 2021-01-01 RX ADMIN — HYDROXYZINE HYDROCHLORIDE 50 MG: 50 TABLET, FILM COATED ORAL at 21:36

## 2021-01-01 RX ADMIN — LOPERAMIDE HYDROCHLORIDE 4 MG: 2 CAPSULE ORAL at 13:14

## 2021-01-01 RX ADMIN — FOLIC ACID 1 MG: 1 TABLET ORAL at 08:50

## 2021-01-01 RX ADMIN — LOSARTAN POTASSIUM 100 MG: 50 TABLET, FILM COATED ORAL at 08:54

## 2021-01-01 RX ADMIN — BUSPIRONE HYDROCHLORIDE 15 MG: 10 TABLET ORAL at 09:30

## 2021-01-01 RX ADMIN — LORAZEPAM 1 MG: 1 TABLET ORAL at 21:17

## 2021-01-01 RX ADMIN — BUSPIRONE HYDROCHLORIDE 15 MG: 10 TABLET ORAL at 08:32

## 2021-01-01 RX ADMIN — CHLORDIAZEPOXIDE HYDROCHLORIDE 25 MG: 25 CAPSULE ORAL at 21:36

## 2021-01-01 RX ADMIN — FOLIC ACID 1 MG: 1 TABLET ORAL at 09:17

## 2021-01-01 RX ADMIN — THIAMINE HCL TAB 100 MG 100 MG: 100 TAB at 08:08

## 2021-01-01 RX ADMIN — TOPIRAMATE 100 MG: 100 TABLET, FILM COATED ORAL at 21:13

## 2021-01-01 RX ADMIN — THIAMINE HCL TAB 100 MG 100 MG: 100 TAB at 08:50

## 2021-01-01 RX ADMIN — BUPROPION HYDROCHLORIDE 100 MG: 100 TABLET, FILM COATED, EXTENDED RELEASE ORAL at 08:38

## 2021-01-01 RX ADMIN — CHLORDIAZEPOXIDE HYDROCHLORIDE 20 MG: 10 CAPSULE ORAL at 08:55

## 2021-01-01 RX ADMIN — CHLORDIAZEPOXIDE HYDROCHLORIDE 20 MG: 10 CAPSULE ORAL at 08:43

## 2021-01-01 RX ADMIN — METOPROLOL SUCCINATE 50 MG: 50 TABLET, EXTENDED RELEASE ORAL at 09:17

## 2021-01-01 RX ADMIN — MELATONIN TAB 3 MG 3 MG: 3 TAB at 21:37

## 2021-01-01 RX ADMIN — CHLORDIAZEPOXIDE HYDROCHLORIDE 5 MG: 5 CAPSULE ORAL at 14:00

## 2021-01-01 RX ADMIN — FLUTICASONE PROPIONATE 2 SPRAY: 50 SPRAY, METERED NASAL at 09:36

## 2021-01-01 RX ADMIN — BUPROPION HYDROCHLORIDE 100 MG: 100 TABLET, FILM COATED, EXTENDED RELEASE ORAL at 09:32

## 2021-01-01 RX ADMIN — METOPROLOL SUCCINATE 50 MG: 50 TABLET, EXTENDED RELEASE ORAL at 09:32

## 2021-01-01 RX ADMIN — CHLORDIAZEPOXIDE HYDROCHLORIDE 5 MG: 5 CAPSULE ORAL at 21:26

## 2021-01-01 RX ADMIN — BUPROPION HYDROCHLORIDE 100 MG: 100 TABLET, FILM COATED, EXTENDED RELEASE ORAL at 08:39

## 2021-01-01 RX ADMIN — BUPROPION HYDROCHLORIDE 100 MG: 100 TABLET, FILM COATED, EXTENDED RELEASE ORAL at 07:54

## 2021-01-01 RX ADMIN — BUSPIRONE HYDROCHLORIDE 15 MG: 10 TABLET ORAL at 21:37

## 2021-01-01 RX ADMIN — ONDANSETRON 4 MG: 4 TABLET, ORALLY DISINTEGRATING ORAL at 22:46

## 2021-01-01 RX ADMIN — AMLODIPINE BESYLATE 2.5 MG: 5 TABLET ORAL at 08:33

## 2021-01-01 RX ADMIN — THIAMINE HCL TAB 100 MG 100 MG: 100 TAB at 08:49

## 2021-01-01 RX ADMIN — PANTOPRAZOLE SODIUM 40 MG: 40 TABLET, DELAYED RELEASE ORAL at 08:53

## 2021-07-05 NOTE — ED NOTES
Pt belongings shirt, pants and shoes placed in pt belongings bag bag zip tied and labeled and being kept behind nurses station 3

## 2021-07-05 NOTE — ED TRIAGE NOTES
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

## 2021-07-05 NOTE — ED PROVIDER NOTES
EMERGENCY DEPARTMENT HISTORY AND PHYSICAL EXAM      Date: 7/5/2021  Patient Name: Modesto Swanson      History of Presenting Illness     Chief Complaint   Patient presents with    Mental Health Problem       History Provided By: Patient    HPI: Vijay Collier, 45 y.o. male with a past medical history significant Alcoholism and depression presents to the ED with cc of wanting to recover from alcohol and says that his life is a mess and he would like to drink himself to death. He denies any other symptoms or attempts or ingestions today. He says his last drink was this morning. There are no other exacerbating relieving factors. He is not currently under the care of psychiatry. There are no other complaints, changes, or physical findings at this time. PCP: None        Past History     Past Medical History:  No past medical history on file. Past Surgical History:  No past surgical history on file. Family History:  No family history on file. Social History:  Social History     Tobacco Use    Smoking status: Not on file   Substance Use Topics    Alcohol use: Not on file    Drug use: Not on file       Allergies:  No Known Allergies      Review of Systems     Review of Systems   Constitutional: Negative. Negative for appetite change, chills, fatigue and fever. HENT: Negative. Negative for congestion and sinus pain. Eyes: Negative. Negative for pain and visual disturbance. Respiratory: Negative. Negative for chest tightness and shortness of breath. Cardiovascular: Negative. Negative for chest pain. Gastrointestinal: Negative. Negative for abdominal pain, diarrhea, nausea and vomiting. Genitourinary: Negative. Negative for difficulty urinating. No discharge   Musculoskeletal: Negative. Negative for arthralgias. Skin: Negative. Negative for rash. Neurological: Negative. Negative for weakness and headaches. Hematological: Negative. Psychiatric/Behavioral: Negative. Negative for agitation. The patient is not nervous/anxious. Depression   All other systems reviewed and are negative. Physical Exam     Physical Exam  Vitals and nursing note reviewed. Constitutional:       General: He is not in acute distress. Appearance: He is well-developed. HENT:      Head: Normocephalic and atraumatic. Nose: Nose normal.      Mouth/Throat:      Mouth: Mucous membranes are moist.      Pharynx: Oropharynx is clear. No oropharyngeal exudate. Eyes:      General:         Right eye: No discharge. Left eye: No discharge. Conjunctiva/sclera: Conjunctivae normal.      Pupils: Pupils are equal, round, and reactive to light. Cardiovascular:      Rate and Rhythm: Normal rate and regular rhythm. Chest Wall: PMI is not displaced. No thrill. Heart sounds: Normal heart sounds. No murmur heard. No friction rub. No gallop. Pulmonary:      Effort: Pulmonary effort is normal. No respiratory distress. Breath sounds: Normal breath sounds. No wheezing or rales. Chest:      Chest wall: No tenderness. Abdominal:      General: Bowel sounds are normal. There is no distension. Palpations: Abdomen is soft. There is no mass. Tenderness: There is no abdominal tenderness. There is no guarding or rebound. Musculoskeletal:         General: Normal range of motion. Cervical back: Normal range of motion and neck supple. Lymphadenopathy:      Cervical: No cervical adenopathy. Skin:     General: Skin is warm and dry. Capillary Refill: Capillary refill takes less than 2 seconds. Findings: No erythema or rash. Neurological:      Mental Status: He is alert and oriented to person, place, and time. Cranial Nerves: No cranial nerve deficit.       Coordination: Coordination normal.   Psychiatric:         Mood and Affect: Mood normal.         Behavior: Behavior normal.      Comments: Endorses suicidal ideation at this time         Lab and Diagnostic Study Results     Labs -     Recent Results (from the past 12 hour(s))   CBC WITH AUTOMATED DIFF    Collection Time: 07/05/21  6:40 PM   Result Value Ref Range    WBC 6.4 4.1 - 11.1 K/uL    RBC 5.17 4.10 - 5.70 M/uL    HGB 17.4 (H) 12.1 - 17.0 g/dL    HCT 49.1 36.6 - 50.3 %    MCV 95.0 80.0 - 99.0 FL    MCH 33.7 26.0 - 34.0 PG    MCHC 35.4 30.0 - 36.5 g/dL    RDW 12.0 11.5 - 14.5 %    PLATELET 197 343 - 252 K/uL    MPV 9.9 8.9 - 12.9 FL    NRBC 0.0 0.0  WBC    ABSOLUTE NRBC 0.00 0.00 - 0.01 K/uL    NEUTROPHILS 50 32 - 75 %    LYMPHOCYTES 40 12 - 49 %    MONOCYTES 7 5 - 13 %    EOSINOPHILS 2 0 - 7 %    BASOPHILS 1 0 - 1 %    IMMATURE GRANULOCYTES 0 0 - 0.5 %    ABS. NEUTROPHILS 3.2 1.8 - 8.0 K/UL    ABS. LYMPHOCYTES 2.5 0.8 - 3.5 K/UL    ABS. MONOCYTES 0.5 0.0 - 1.0 K/UL    ABS. EOSINOPHILS 0.1 0.0 - 0.4 K/UL    ABS. BASOPHILS 0.1 0.0 - 0.1 K/UL    ABS. IMM.  GRANS. 0.0 0.00 - 0.04 K/UL    DF AUTOMATED     URINALYSIS W/ REFLEX CULTURE    Collection Time: 07/05/21  6:45 PM    Specimen: Urine   Result Value Ref Range    Color STRAW     Appearance Clear Clear      Specific gravity 1.010 1.003 - 1.030      Specific gravity 1.010 1.003 - 1.030      pH (UA) 5.0 5.0 - 8.0      Protein Negative Negative mg/dL    Glucose Normal (A) Negative mg/dL    Ketone Negative Negative mg/dL    Bilirubin Negative Negative      Blood Negative Negative      Urobilinogen Normal 0.1 - 1.0 EU/dL    Nitrites Negative Negative      Leukocyte Esterase Negative Negative      WBC <1 0 - 4 /hpf    RBC 1 0 - 5 /hpf    Bacteria Negative Negative /hpf    UA:UC IF INDICATED Urine Culture Ordered (A) Culture not indicated by UA result      Mucus Trace (A) Negative /lpf   DRUG SCREEN, URINE    Collection Time: 07/05/21  6:45 PM   Result Value Ref Range    AMPHETAMINES Negative Negative      BARBITURATES Negative Negative      BENZODIAZEPINES Negative Negative      COCAINE Negative Negative      METHADONE Negative Negative      OPIATES Negative Negative      PCP(PHENCYCLIDINE) Negative Negative      THC (TH-CANNABINOL) Negative Negative      Drug screen comment        This test is a screen for drugs of abuse in a medical setting only (i.e., they are unconfirmed results and as such must not be used for non-medical purposes, e.g.,employment testing, legal testing). Due to its inherent nature, false positive (FP) and false negative (FN) results may be obtained. Therefore, if necessary for medical care, recommend confirmation of positive findings by GC/MS. SARS-COV-2    Collection Time: 07/05/21  8:30 PM   Result Value Ref Range    SARS-CoV-2 Please find results under separate order     ETHYL ALCOHOL    Collection Time: 07/05/21  8:30 PM   Result Value Ref Range    ALCOHOL(ETHYL),SERUM 187 (H) <60 mg/dL   METABOLIC PANEL, COMPREHENSIVE    Collection Time: 07/05/21  8:30 PM   Result Value Ref Range    Sodium 139 136 - 145 mmol/L    Potassium 3.3 (L) 3.5 - 5.1 mmol/L    Chloride 107 97 - 108 mmol/L    CO2 21 21 - 32 mmol/L    Anion gap 11 5 - 15 mmol/L    Glucose 137 (H) 65 - 100 mg/dL    BUN 7 6 - 20 mg/dL    Creatinine 0.60 (L) 0.70 - 1.30 mg/dL    BUN/Creatinine ratio 12 12 - 20      GFR est AA >60 >60 ml/min/1.73m2    GFR est non-AA >60 >60 ml/min/1.73m2    Calcium 8.1 (L) 8.5 - 10.1 mg/dL    Bilirubin, total 0.2 0.2 - 1.0 mg/dL    AST (SGOT) 29 15 - 37 U/L    ALT (SGPT) 25 12 - 78 U/L    Alk. phosphatase 81 45 - 117 U/L    Protein, total 7.3 6.4 - 8.2 g/dL    Albumin 3.6 3.5 - 5.0 g/dL    Globulin 3.7 2.0 - 4.0 g/dL    A-G Ratio 1.0 (L) 1.1 - 2.2         Radiologic Studies -   [unfilled]  CT Results  (Last 48 hours)    None        CXR Results  (Last 48 hours)    None          Medical Decision Making and ED Course   - I am the first and primary provider for this patient AND AM THE PRIMARY PROVIDER OF RECORD.     - I reviewed the vital signs, available nursing notes, past medical history, past surgical history, family history and social history. - Initial assessment performed. The patients presenting problems have been discussed, and the staff are in agreement with the care plan formulated and outlined with them. I have encouraged them to ask questions as they arise throughout their visit. Vital Signs-Reviewed the patient's vital signs. Patient Vitals for the past 12 hrs:   Temp Pulse Resp BP SpO2   07/05/21 1648 98.2 °F (36.8 °C) 93 16 (!) 142/95 99 %       Records Reviewed: Nursing Notes  ED Course:   Is a pleasant 50-year-old male with known past medical history significant for major depressive disorder who presents with suicidal ideations. He is medically stable from this perspective and is awaiting behavioral health evaluation. Provider Notes (Medical Decision Making): MDM           Consultations:       Consultations: - Psychiatry Consultant: Dr. Jeimy Fernandez: We have asked for emergent assistance with regard to this patient. We are currently in the process of providing medical clearance for the patient and have requested that Psychiatry provide this patient an initial assessment as well as ongoing psychiatric care while in the Emergency Department. This would include psychiatric medication management and determination of placement for their acute and chronic diagnosis. Procedures and Critical Care       Performed by: Ginger Tanner MD  PROCEDURES:  Procedures         ALCOHOL/SUBSTANCE ABUSE COUNSELING: Upon evaluation, pt endorsed recent alcohol/illicit drug use. For approximately 15 minutes, pt has been counseled on the dangers of alcohol and illicit drug use on their health, and they were encouraged to quit as soon as possible in order to decrease further risks to their health. Pt has conveyed their understanding of the risks involved should they continue to use these products. Disposition     Disposition: Condition stable        Remove if not discharged  DISCHARGE PLAN:  1.  There are no discharge medications for this patient. 2.   Follow-up Information    None       3. Return to ED if worse   4. There are no discharge medications for this patient. Diagnosis     Clinical Impression:   1. Major depressive disorder, remission status unspecified, unspecified whether recurrent        Attestations:    Elida Campbell MD    Please note that this dictation was completed with [employer], the computer voice recognition software. Quite often unanticipated grammatical, syntax, homophones, and other interpretive errors are inadvertently transcribed by the computer software. Please disregard these errors. Please excuse any errors that have escaped final proofreading. Thank you.

## 2021-07-05 NOTE — BSMART NOTE
Pt arrived at ED via private vehicle (family) and assessed in ED 23    Pt presented with SI w/out plan    Pt presented with is unkempt and shows no evidence of impairment. Pt thought process shows no evidence of impairment    Pt cognition  appropriate decision making     Pt reports has been hospitalized once     Most Recent Hospitalizations if any: 2019    Pt reports no OP tx    Pt does not have a hx of legal issues. Pt does not have hx of violence/aggression     Pt reports Alcohol use    Pt UDS positive for: N/A    Hx. Of Substance Treatment: YES  When: 2019  Where: [address]    Access to Weapons: NO    If weapons, Have they been removed: N/A    Trauma Hx:   Sexual: NO  When:   By Whom:    Physical: {NO  When:  By Whom:    Verbal: NO When:   By Whom:      Family Support: YES    Who: AntonioerMague              Safety Plan Completed: N/A        PATIENT NARRATIVE SUMMARY:  Pt presents to ED after called [address] [place] [address] for a bed and [facility] referred Pt to [address] [place] [address] for medical clearance before admission. Pt reports his is \"dirnking a lot\" up to \"18 beers\" a day and a pint of liquor. Pt reports his last drink was today. Pt reports drinking this heavy for the last \"two months\" and prior to that had 1 year of sobriety. Pt reports SI w/ a plan to \"drink himself to death\" and reports feeling \"very depressed \" lately mainly due to family stressors reporting his daughter is \"in trouble\" and he isn't close w/ his other children like he would like. Pt denies any seizures in the past while detoxing. Pt reports sleep and appetite disturbance. Pt's last  [facility] admission was 2019 at [address] [place] [address]. Pt reports hx of depression, no current meds. Pt reports he called [facility] and would like to transfer there. Writer called and spoke w/ Gage Fontana who reports they are holding a bed for the Pt and requested the Pt's labs.  to fax labs. This writer will follow up as needed.         consulted and Pt accepted to 2S if admission to [address] [place] [address] falls through and medically cleared.

## 2021-07-06 PROBLEM — F32.A DEPRESSION: Status: ACTIVE | Noted: 2021-01-01

## 2021-07-06 NOTE — BSMART NOTE
Writer checked in with assigned Nurse Osmin re: COVID-19 status. COVID now taken/administered. Writer also followed-up with [facility] [address] awaiting COVID results, updated labs and physician notes. Writer to fax when Chandler Dolan becomes available. 3042: Writer re-assessed patient. Pt now willing to remain at [facility] instead of [facility] to [facility] and reports \"I am sick as a dog and feel like I am detoxing\". Remains unable to contract for safety. Pt would like some form of medication to help with nausea. , etc. Writer relayed to [facility] for nurse Eron Miranda.

## 2021-07-06 NOTE — ROUTINE PROCESS
TRANSFER - OUT REPORT:    Verbal report given to Nevin Persons on Kayleen Cortez Fine  being transferred to  for routine progression of care       Report consisted of patients Situation, Background, Assessment and   Recommendations(SBAR). Information from the following report(s) SBAR was reviewed with the receiving nurse. Lines:       Opportunity for questions and clarification was provided.       Patient transported with:   "[employer]"

## 2021-07-06 NOTE — ED NOTES
Care assumed and bedside SBAR report endorsed on 45 y.o. male with a past medical history significant Alcoholism and depression presents to the ED with cc of wanting to recover from alcohol and says that his life is a mess and he would like to drink himself to death. He denies any other symptoms or attempts or ingestions today. He says his last drink was yesterday morning. Pt currently alert and oriented x3, pain currently within manageable limits, IV patent, IV bolus infusing as ordered, awaiting repeat ETOH level for medical clearance for pysch placement, plan of care reinforced, bed in lowest position, side rails up x2, call bell within reach, will continue to monitor. 0515  ETOH level drawn, Pt resting comfortably, sleeping but easily aroused, no sign or symptoms of pain or discomfort, VS WNL, frequent checks, bed in lowest position, side rails up x2, call bell within reach, will continue to monitor.

## 2021-07-06 NOTE — BH NOTES
Glennie Snellen is a 46 YO male admitted from the [facility] ED to the [facility] under the psychiatric care of Dr. Nabeel Cespedes. Voluntary admission status. DX:  Major Depressive Disorder with Suicidal Ideations and ETOH abuse. Arrive on the unit in a wheelchair oriented x 4 escorted by ED Tech. Compliant with safety search by ED Tech and this RN. /100 HR 88 Temp 98.9 RR18. Patient reports history of hypertension and acid reflux. Dr. Thomas Rivera on unit for H&P. Patient reports increasing stressors of \"legal issues\" that he did not want to elaborate on. Also reports relationship stressors. Reports drinking \"all day all the time\". Denies history of sexual, mental and/or physical abuse. Reports his support system as his mother and siblings. Orders obtained from Dr. Nabeel Cespedes including close observation q 15 minute checks and detox protocol. Patient oriented to unit and signed appropriate paperwork. Will continue to monitor.

## 2021-07-07 NOTE — BH NOTES
Patient has been up for meals. He has been pleasant. He c/o being tired this morning due to the trazodone he has last night . He stated he took it because he did not want to toss & turn all night. He was reminded that he did not have to take it. .It is PRN. He has verbalized his depression 5/10, anxiety 6/10. He denies any feeling of SI or HI . He has been resting in bed most of the shift. He remains on close observation.

## 2021-07-07 NOTE — BH NOTES
PSA PART II ADDITIONAL INFORMATION        Access To Fire Arms: No    Substance Use: YES    Last Use: Day of admission    Type of Substance: Alcohol use    Frequency of Use: Pt reports that he has been drinking about a pint of liquor and a 12 pack of beet daily, reports that his last use was day of admission. Pt reports first use at age 14. Request to See : YES    If yes, notified : YES    Release of Information Signed: YES    Release of Information Signed For: Pt signed STACIE for his brother Mk Mishra at [phone].

## 2021-07-07 NOTE — BH NOTES
PSYCHOSOCIAL ASSESSMENT  :Patient identifying info:   Yanick Jackson is a 45 y.o., male admitted 7/5/2021  5:00 PM     Presenting problem and precipitating factors:   Pt reports that he had quit drinking since he was last [facility] after his time at [facility]. Pt reports that about 2 /12 weeks ago, he began drinking again while hanging out with friends. Pt reports that his drinking escalated to the point that he was waking up in the middle of the night to stave off the shakes. Pt reports that he has been increasingly depressed about his use and wanted to detox. Mental status assessment:   Pt presents seated in plain clothes, good eye contact, soft spoken, flat affect, depressed mood, polite and engaged with writer. 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 Collateral information:   Pt signed STACIE for his brother Maribel Newton at [phone]. Current psychiatric /substance abuse providers and contact info:   Pt denies    Previous psychiatric/substance abuse providers and response to treatment:   Pt reports that he has had over 10 past inpatient hospitalizations, reports that he has also been to rehab for alcohol several times. Family history of mental illness or substance abuse:   Pt denies. Substance abuse history:    Social History     Tobacco Use    Smoking status: Not on file   Substance Use Topics    Alcohol use: Not on file   Pt reports that he has been drinking about a pint of liquor and a 12 pack of beet daily, reports that his last use was day of admission. Pt reports first use at age 14. History of biomedical complications associated with substance abuse :  Pt reports shakes, stomach pain, nausea, diarrhea etc, denies any seizures from detox. Patient's current acceptance of treatment or motivation for change:  Pt rates motivation for change at 10/10, states I'm 51, its my last chance. \"    Family constellation:   Pt reports family included mother, sister, brother, 2 sons, daughter and grand children. Is significant other involved? Pt denies    Describe support system:   Pt reports main support system is his mother and brother    Describe living arrangements and home environment:  Pt reports he lives in a camper on his Step Aunt's property by himself. Guns:  Pt denies. Health issues:   Hospital Problems  Never Reviewed        Codes Class Noted POA    Depression ICD-10-CM: F32.9  ICD-9-CM: 985  2021 Unknown              Trauma history:   Pt denies    Legal issues:   Pt denies    History of  service:   Pt denies    Financial status:   Pt states he has no money, is not on disability, SSI, or unemployment. Baptism/cultural factors:   Pt reports that he is Yazidism, reports that he would like to see the marley while here. 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 Have you been licensed as a health care professional (current or ):   Pt reports that he is Yazidism, reports that he would like to see the marley while here. Leisure and recreation preferences:   Pt reports that he likes to work on engines and go fishing. Describe coping skills:  Pt reports main coping skill is drinking.     Beth Ground  2021

## 2021-07-07 NOTE — GROUP NOTE
IP  GROUP DOCUMENTATION INDIVIDUAL                                                                          Group Therapy Note    Date: 7/6/2021    Group Start Time: 2000  Group End Time: 2045  Group Topic: Recreational/Music Therapy    [facility] 2  NON ACUTE    Naresh Montes    IP [facility] GROUP DOCUMENTATION GROUP    Group Therapy Note    Attendees: 6/10  Introduced leisure task skill as positive way to cope and manage mood. Attendance: Attended    Patient's Goal:  STG: Attends activities and groups      Interventions/techniques: Art integration and Supported    Follows Directions: Followed directions    Interactions: Interacted appropriately    Mental Status: Calm and Flat    Behavior/appearance: Attentive and Cooperative    Goals Achieved: Able to engage in interactions and Able to listen to others      Additional Notes: Attended group . Received leisure activity packet. Selected songs to listen to in group and worked on art/puzzles in group. Was receptive to intervention. Used leisure time constructively.        Heidi Brown

## 2021-07-07 NOTE — PROGRESS NOTES
Problem: Depressed Mood (Adult/Pediatric)  Goal: *STG: Attends activities and groups  Outcome: Progressing Towards Goal     Problem: Depressed Mood (Adult/Pediatric)  Goal: *STG: Complies with medication therapy  Outcome: Progressing Towards Goal     Pilar Diaz complied with medication regimen and attended group; Pilar Diaz continues to progress towards these goals.

## 2021-07-07 NOTE — PROGRESS NOTES
. Any Medrano actively participated in Spirituality Group on Behavioral Unit on spirituality and change. Chaplains will follow up if further referrals are requested. Chaplain Angela Bravo M.Div.    can be reached by calling the  at [facility] acute hospital  [phone]

## 2021-07-07 NOTE — GROUP NOTE
TARA  GROUP DOCUMENTATION INDIVIDUAL                                                                          Group Therapy Note    Date: 7/7/2021    Group Start Time: 1100  Group End Time: 1200  Group Topic: Education Group - Inpatient    [facility]    Palak Galloway    IP [facility] GROUP DOCUMENTATION GROUP    Group Therapy Note    Attendees: Patients educated and completed safety plans. Patients encouraged to discuss their warning sighs, coping skills, and support systems. Patients encouraged to discuss openly and honestly and also be respectful and supportive of their peers. Attendance: Did not attend          Additional Notes:  Pt encouraged but did not attend group.     Beth Hyman

## 2021-07-07 NOTE — BH NOTES
Nurse Note:    Pilar Diaz complied with medication regimen and attended music therapy group this shift. On assessment Pilar Diaz denies SI, HI, A/V hallucinations. No report of pain or discomfort. C/o of mild nausea and fine motor tremors when coloring during the group. Reports depression as 5/10 and anxiety 6/10. Reports that he is forcing himself to eat; staff encouraged him to eat light, to not force himself, and to drink fluids. Patient stated that he understood. Received PRN meds for anxiety and sleep. Will continue to monitor for safety.

## 2021-07-07 NOTE — H&P
Psychiatry History and Physical    Subjective:     Date of Evaluation:  7/7/2021    Chief complaint-was going to drink himself to death, depression. History of Presenting Problem: Mr. Dee holt 53-year-old male who presents to the emergency department having increased depression and increased drinking for the past 2 months and has been feeling suicidal to drink himself to death. He expresses that he has been increasingly depressed and feeling hopeless helpless poor appetite poor sleep loss of interest and stressors relating to his family. He has not been as close with his children as he would like and his daughter has been in trouble. He denies having any auditory or visual hallucinations. Denies any command hallucinations. Not feeling paranoid. Patient initially presented to [facility] and was sent to the emergency department for medical clearance and was later reported for needing an patient admission here at [address]. Previous psychiatric treatment-previous psychiatric hospitalizations last in 2019. he does not have any outpatient treatment reported. Substance use includes alcohol abuse versus dependence. He has been drinking about 18 beers per day and a pint of liquor. Legal issues none reported. Review of system no nausea vomiting no chest pain or shortness of breath reported reports feeling tired. Mental status examination-patient is alert oriented x3 speech is coherent normal tone rate volume no flight of ideas no loosening of associations. Legal trauma history-patient states that he did not contribute to his current hospitalization. Patient Active Problem List    Diagnosis Date Noted    Depression 07/06/2021     No past medical history on file. No past surgical history on file. No family history on file.    Social History     Tobacco Use    Smoking status: Not on file   Substance Use Topics    Alcohol use: Not on file     Prior to Admission medications    Not on File No Known Allergies     Review of Systems    Objective:     Patient Vitals for the past 8 hrs:   BP Temp Pulse Resp   07/07/21 0845 122/79  (!) 105    07/07/21 0817 122/79 97.5 °F (36.4 °C) (!) 105 18       Impression:      Major depressive disorder with anxiety  Alcohol dependence  Substance-induced mood disorder. Individual therapy group therapy support groups  Length of stay is 5 to 7 days strengths overall healthy vertex himself. .  Aggressive treatment.   Hospitalizations and mental health date please discharge address relapse prevention    Strengths overall healthy able to express himself average intelligence weakness limits primary support system psychosocial stressors comorbid alcohol abuse    Current Facility-Administered Medications:     [START ON 7/8/2021] sertraline (ZOLOFT) tablet 50 mg, 50 mg, Oral, DAILY, Shauna Miner MD    chlordiazePOXIDE (LIBRIUM) capsule 20 mg, 20 mg, Oral, QID, 20 mg at 07/07/21 2125 **AND** chlordiazePOXIDE (LIBRIUM) capsule 5 mg, 5 mg, Oral, QID, Shauna Miner MD, 5 mg at 07/07/21 2126    OLANZapine (ZyPREXA) tablet 5 mg, 5 mg, Oral, Q6H PRN, Severa Fitz, MD    haloperidol lactate (HALDOL) injection 5 mg, 5 mg, IntraMUSCular, Q6H PRN, Shauna Miner MD    hydrOXYzine HCL (ATARAX) tablet 50 mg, 50 mg, Oral, TID PRN, Severa Fitz, MD, 50 mg at 07/06/21 2136    traZODone (DESYREL) tablet 50 mg, 50 mg, Oral, QHS PRN, Severa Fitz, MD, 50 mg at 07/07/21 2126    acetaminophen (TYLENOL) tablet 650 mg, 650 mg, Oral, Q4H PRN, Severa Fitz, MD    magnesium hydroxide (MILK OF MAGNESIA) 400 mg/5 mL oral suspension 30 mL, 30 mL, Oral, DAILY PRN, Shauna Miner MD    ibuprofen (MOTRIN) tablet 400 mg, 400 mg, Oral, Q8H PRN, Shauna Miner MD    nicotine (NICODERM CQ) 21 mg/24 hr patch 1 Patch, 1 Patch, TransDERmal, DAILY, Shauna Miner MD, 1 Patch at 07/07/21 0846    losartan (COZAAR) tablet 100 mg, 100 mg, Oral, DAILY, Chas Mendosa MD, 100 mg at 07/07/21 0845    albuterol (PROVENTIL HFA, VENTOLIN HFA, PROAIR HFA) inhaler 2 Puff, 2 Puff, Inhalation, Q6H PRN, Emily De Dios MD    pantoprazole (PROTONIX) tablet 40 mg, 40 mg, Oral, ACB, Emily De Dios MD, 40 mg at 07/07/21 8105  Plan:     Recommendations for Treatment/Conditions:  Psychiatric treatment recommended while in hospital    Referral To: Inpatient psychiatric care    Competency Statement: At the current time, the patient is competent to make informed consent regarding their current medical care and discharge planning and/or financial decisions.

## 2021-07-07 NOTE — H&P
[address] [place] [address]  HISTORY AND PHYSICAL    Name:  Carolina Medina  MR#:  [id number]  :  1970  ACCOUNT #:  [de-identified]  ADMIT DATE:  2021      The patient is a 18-year-old white male who has been admitted here for psychiatric evaluation and treatment. He says that he has history of nausea, which is because of withdrawal from his alcohol. Denies any history of chest pain or shortness of breath. No history of cough, fever or chills. No history of vomiting, diarrhea, abdominal pain or black stools. No history of increased frequency of micturition or painful micturition. No history of any joint pain or joint swelling. No history of headache or dizziness. No history of loss of consciousness or seizures. No history of change in vision. PAST MEDICAL HISTORY:  History of hypertension and GERD. SOCIAL HISTORY:  Smokes about 1-1/2 pack a day. Positive history of alcohol abuse. Denies any history of drug abuse. ALLERGIES:  NO KNOWN DRUG ALLERGIES. PAST SURGICAL HISTORY:  Negative. FAMILY HISTORY:  His brother has history of hypertension. MEDICATIONS ON ADMISSION:  None. REVIEW OF SYSTEMS:  CONSTITUTIONAL:  No history of major change in appetite or change in weight. No history of fever or chills. HEENT:  No history of headache, dizziness, loss of consciousness or seizures. No history of any change in vision. No history of ear or nasal discharge. No history of throat pain. CARDIOVASCULAR:  No history of chest pain, dyspnea on exertion, orthopnea or PND. RESPIRATORY:  No history of cough, fever or chills. GI:  No history of vomiting, diarrhea, abdominal pain or melena. History of nausea. :  No history of dysuria, polyuria or hematuria. MUSCULOSKELETAL:  No history of joint pain or joint swelling. NEUROLOGY:  No history of any neurological deficits. PSYCHIATRIC:  History of depression. HEMATOLOGY/ONCOLOGY:  No history of any malignancy.   ENDOCRINE:  No history of diabetes or any other endocrine abnormalities. SKIN:  No history of abnormal mole, ulcer, or rash. PHYSICAL EXAMINATION:  GENERAL:  The patient is a 59-year-old white male, moderately obese, not in any acute distress. Alert, awake, oriented x3. VITAL SIGNS:  His temperature is 98.9, pulse rate is 51, blood pressure 142/95, respiratory rate is 16. Height is 5 feet 6 inches, weighs 83.9 kg. BSA is 1.98. HEENT:  Normocephalic, atraumatic skull. Pupils are bilaterally equally reacting to light. Sclerae nonicteric. Conjunctivae pink. No edema of the eyelids. No ear or nasal discharge. Tongue is pink. No ulcer. Positive gag reflex. No pharyngeal inflammation. Extraocular movements are intact. NECK:  Supple. Full range of motion. No JVD. No HJR. No lymphadenopathy. No thyromegaly. No carotid bruit. RESPIRATORY:  Chest expands bilaterally equal and well. No localized swelling or tenderness. Scattered expiratory rhonchi all over. No rales. CVS:  S1 and S2 audible, regular. No murmur, gallop, or pericardial rub. ABDOMEN:  Obese. Bowel sounds are positive. No organomegaly. No tenderness. EXTREMITIES:  No edema. No clubbing. No cyanosis. Peripheral pulse 2+. CNS:  Grossly unremarkable. Cranial nerves I through XII are individually checked, and they are intact. Muscle power 5/5. Reflexes 2+. Plantars downgoing. No sensory abnormality or deficit. For cranial nerves, the patient is able to hear well, see well, taste well. The tongue is protruding in the middle. Also has positive gag reflex. No sensory abnormality or deficit. Also, the patient has Romberg sign positive. Finger-to-nose test is negative. DIAGNOSES:  1. Hypertension, uncontrolled. 2.  Gastroesophageal reflux disease. 3.  Alcohol abuse. 4.  Depression. 5.  Hypokalemia. 6.  Smoking. 7.  Hyperglycemia. 8.  Acute bronchospasm.     LABORATORY DATA:  The patient's labs are WBC count 6.4, hemoglobin 17.4, hematocrit 49.1, platelets are 188. Urinalysis is negative. Sodium 139, potassium 3.3, chloride 107, bicarb 21, BUN is 7, creatinine 0.6, glucose is 137, ALT is 25, AST 29. Urine drug tox screen is negative. PLAN:  According to the patient, he was on metoprolol at home, but because he smokes 1-1/2 pack a day, has expiratory wheezing, so I will stop the metoprolol, and I will start him on losartan 100 mg daily. Also, I will start him on Protonix 40 mg daily. Advised him to exercise and diet. Advised him to quit smoking. The patient is medically stable for psychiatric evaluation and treatment. He can participate in all activities without any reservations. For hypokalemia, I will give him potassium supplement and check BMP in the morning. Also, I will start him on albuterol inhaler 2 puffs q.i.d. p.r.n. for his wheezing.       MD HERNESTO Anaya/[email or web address]  D:  07/06/2021 17:44  T:  07/06/2021 22:22  JOB #:  [id number]

## 2021-07-08 NOTE — BH NOTES
Patient up to the dayroom for meals, ate 100%. Medication compliant. No side effects noted. VSS. No tremors. Reports feeling better. Attends all groups. States that he slept well last night. Denies SI/HI. Denies AVH. No physical complaints voiced. Pleasant on approach. Calm & cooperative. Remains on CO. Continue to monitor.

## 2021-07-08 NOTE — PROGRESS NOTES
Problem: Falls - Risk of  Goal: *Absence of Falls  Description: Document Amrit Burrell Fall Risk and appropriate interventions in the flowsheet. Outcome: Progressing Towards Goal  Note: Fall Risk Interventions:     Problem: Depressed Mood (Adult/Pediatric)  Goal: *STG: Verbalizes anger, guilt, and other feelings in a constructive manor  Outcome: Progressing Towards Goal     Problem: Depressed Mood (Adult/Pediatric)  Goal: *STG: Remains safe in hospital  Outcome: Progressing Towards Goal     Problem: Depressed Mood (Adult/Pediatric)  Goal: *STG: Complies with medication therapy  Outcome: Progressing Towards Goal    Patient has not fallen so far this shift. Patient was able to share his emotions. Patient has been safe in the hospital.  Patient was medication compliant. Patient remains on close observation. Patient has been alert, oriented, cooperative and pleasant. He attended music group. Patient was later in the dayroom coloring. He said he feels \"level-headed. \"   He said he feels better but has a little fatigue. He described his mood as \"getting happier. \"  He rated his depression as 5/10, anxiety as 6/10. He denied any SI or HI. He denied any withdrawal symptoms. Patient was medication compliant. Continue to assess. Since going to bed, patient has been laying down quietly with his eyes closed.

## 2021-07-08 NOTE — GROUP NOTE
IP  GROUP DOCUMENTATION INDIVIDUAL                                                                          Group Therapy Note    Date: 7/7/2021    Group Start Time: 1945  Group End Time: 2030  Group Topic: Recreational/Music Therapy    [facility] NON ACUTE    Pri[place]    IP [address] [facility] GROUP DOCUMENTATION GROUP    Group Therapy Note    Attendees: 6/11  Introduce healthy leisure task skill as positive way to cope and manage stress         Attendance: Attended    Patient's Goal:  STG: Attends activities and groups      Interventions/techniques: Art integration and Supported    Follows Directions: Followed directions    Interactions: Interacted appropriately    Mental Status: Calm and Flat    Behavior/appearance: Attentive and Cooperative    Goals Achieved: Able to engage in interactions and Able to listen to others      Additional Notes: Pt attended group and actively participated. Was receptive to intervention. Was able to receive leisure packet. Worked on task in group and selected songs to listen to while in group. Used leisure time constructively.      Seth Frankel

## 2021-07-08 NOTE — BH NOTES
[facility] Recreational Therapy Assessment    Orientation:  Person, Place, Date and Situation    Reason for Admission:  Pt reports he was admitted because \"I was drinking way too much, afraid that I would be harming myself and decided to come in to get some help\". Pt reports alcohol use  Of 12 pack and a pint of liquor in 24 hr period. Medical Precautions / Conditions:  Evan Grade and HTN    Impairments:     Vision:  Wears Glasses Yes      Wears Contacts No      Are they with Patient Yes     Hearing Aids No     Utilization of Ambulatory Devices:  None    Health Problems Preventing Participation in Activities:  No  How:  n/a  Leisure Interest Checklist:  Art, Bingo, Cards / [address] [place], [employer], Fishing, [employer], Listening to Music, [employer], Playing an Instrument, Reading, Visiting with Others and Texting    Does patient participate in leisure activities:  Sometimes    Setting:  Alone, With Family and With Friends    Other Activities / Skills / Talents: n/a  Do emotions interfere with leisure activities / lifestyles:  Yes    When engaging in leisure activities, do you forget worries:  Yes    Do you belong to a [employer]: Yes    Are you active in [employer] activities:  No    Typical Day:   Work on [employer], work in the yard, work on truck, get on [place]    Strengths:  Verbal Expression, Family Support and handy with hands/tools small engines    Limitations / Barriers:  Substance Abuse and Finances    Treatment Modalities:  Stress Management, Coping Skills, Symptom Recognition, Healthy Thinking, Mood Management and Leisure Skills    Patient Educational  Needs:  Skills to recognize and challenge problematic thinking, Identify positive coping strategies and skills to manage symptoms or moods, Leisure education and Recognition of symptoms and signs    Focus of Treatment:  Introduce positive outlets for self expression and Introduce and encourage the exploration of alternative coping skills    Summary: Pt lives in [facility] in [place] on [address] [facility] [address]. Pt has been doing some odd jobs, helping friends, small engines. Pt has not been seeing any providers. Pt used to see Dr. Bertha Rincon but has not seen in a year. Pt reports goal for hospitalization is to get mentally straight to stay straight and feel better and behave myself\".

## 2021-07-08 NOTE — GROUP NOTE
[facility] GROUP DOCUMENTATION INDIVIDUAL                                                                          Group Therapy Note    Date: 7/8/2021    Group Start Time: 1300  Group End Time: 1400  Group Topic: Education Group - Inpatient    [facility] CARE MANAGEMENT    Ericka Alvarado    [facility] GROUP DOCUMENTATION GROUP    Group Therapy Note    Attendees: 7/13    Patients were asked how they were feeling as a check-in question. Therapist facilitated group to address what trauma is and the common reactions to trauma. Pts were encouraged to share experiences with trauma and talk about healthy ways of coping. Attendance: Attended    Patient's Goal:  Pt responded to the check-in question that they were feeling \"happier\"    Interventions/techniques: Informed, Validated and Supported    Follows Directions: Followed directions    Interactions: Interacted appropriately    Mental Status: Calm and Congruent    Behavior/appearance: Attentive and Cooperative    Goals Achieved:  Able to engage in interactions, Able to listen to others, Able to give feedback to another, Able to reflect/comment on own behavior, Able to manage/cope with feelings and Able to receive feedback      Additional Notes:  Pt was very attentive during groups and was able to display empathy to peers. Pt was able to self-disclose and identify past trauma that he is still dealing with. Pt was able to identify feelings of this.     Mini Castaneda

## 2021-07-08 NOTE — GROUP NOTE
TARA  GROUP DOCUMENTATION INDIVIDUAL                                                                          Group Therapy Note    Date: 7/8/2021    Group Start Time: 1115  Group End Time: 1200  Group Topic: Process Group - Inpatient    [facility] 2 [facility] ACUTE    Delma Stokes    [facility] GROUP DOCUMENTATION GROUP    Group Therapy Note    Attendees: 4/12    Process: Pts were encouraged to share something they want to let go of as a check in question. Pts were then encouraged to share what brought them to the hospital and items discussed was environmental factors and support systems. Pts were then asked to reflect on group         Attendance: Attended    Patient's Goal:  Pt said he wants to let go of alcohol    Interventions/techniques: Validated, Promoted peer support, Provide feedback and Supported    Follows Directions: Followed directions    Interactions: Interacted appropriately    Mental Status: Calm, Congruent and Flat    Behavior/appearance: Attentive, Motivated and Neatly groomed    Goals Achieved: Able to engage in interactions and Able to listen to others      Additional Notes:  Pt spoke about how his daughter is struggling with susbtance use and this is making him feel depressed. He said he wants to try something new with his recovery. Pt also opened up about how his first son passed away.  Pt is making progress towards goals by attending and participating in group    Karolina Pittman
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
## 2021-07-08 NOTE — GROUP NOTE
TARA  GROUP DOCUMENTATION INDIVIDUAL                                                                          Group Therapy Note    Date: 7/8/2021    Group Start Time: 1400  Group End Time: 1500  Group Topic: Special Track - Drug Topic    [facility] OP    Palak Galloway    IP [address] [facility] GROUP DOCUMENTATION GROUP    Group Therapy Note    Attendees: Patients discussed the protracted withdrawal worksheet. Patients informed on what protracted withdrawal is and ways to combat it. Patients encouraged to share openly and honestly. Patients encouraged to support each other and be respectful. Attendance: Attended    Patient's Goal:  Attends activities and groups    Interventions/techniques: Informed, Validated, Promoted peer support, Reinforced and Supported    Follows Directions: Followed directions    Interactions: Interacted appropriately    Mental Status: Calm and Congruent    Behavior/appearance: Attentive, Cooperative, Enthusiastic and Motivated    Goals Achieved: Able to engage in interactions, Able to listen to others, Able to give feedback to another, Able to reflect/comment on own behavior, Able to manage/cope with feelings, Able to receive feedback, Able to experience relief/decrease in symptoms, Able to self-disclose, Discussed coping, Displayed empathy and Identified feelings      Additional Notes:  Pt attended group and was engaged. Pt shared about his alcoholism and how he is not able to control himself. Pt reports that he does not know why alcohol has such a stronghold over him. Pt shared about how he went to [employer] and how it helped him.      Phi Jimenez

## 2021-07-09 NOTE — PROGRESS NOTES
Problem: Falls - Risk of  Goal: *Absence of Falls  Description: Document Sawyermakayla Ramsay Fall Risk and appropriate interventions in the flowsheet. Outcome: Progressing Towards Goal  Note: Fall Risk Interventions:     Problem: Depressed Mood (Adult/Pediatric)  Goal: *STG: Remains safe in hospital  Outcome: Progressing Towards Goal     Problem: Depressed Mood (Adult/Pediatric)  Goal: *STG: Complies with medication therapy  Outcome: Progressing Towards Goal     Patient remains on close observation. Patient has been alert, oriented, cooperative and pleasant. Patient has not voiced any withdrawal symptoms. Patient was medication compliant. Continue to assess. Since going to bed, patient has been laying down quietly with his eyes closed.

## 2021-07-09 NOTE — PROGRESS NOTES
Progress Note  Date:2021       Room:[facility]  Patient Name:Vinnie Swanson     YOB: 1970     Age:51 y.o. Subjective    Subjective   Patient reports he has been depressed and has relapsed into his substance use has been difficult to separate himself. He continues to struggle with his separation from his wife several years ago and has not been able to overcome. He states things fell apart 10 years ago and that he was doing so well in his life. He currently denies any active suicidal or homicidal ideations. Still mostly to himself. Mental status examination-patient is alert oriented x3  Casually dressed limited eye contact spoke with problems denies any active SI HI intent or plan  No evidence of any active psychosis. Insight judgment remains limited  Review of Systems  Objective         Vitals Last 24 Hours:  TEMPERATURE:  Temp  Av °F (36.7 °C)  Min: 97.5 °F (36.4 °C)  Max: 98.4 °F (36.9 °C)  RESPIRATIONS RANGE: Resp  Av.5  Min: 17  Max: 18  PULSE OXIMETRY RANGE: No data recorded  PULSE RANGE: Pulse  Av  Min: 86  Max: 96  BLOOD PRESSURE RANGE: Systolic (00GRW), KRR:680 , Min:129 , RZR:160   ; Diastolic (46WCO), VXB:46, Min:86, Max:94    I/O (24Hr): No intake or output data in the 24 hours ending 21  Objective  Labs/Imaging/Diagnostics    Labs:  CBC:No results for input(s): WBC, RBC, HGB, HCT, MCV, RDW, PLT, HGBEXT, HCTEXT, PLTEXT in the last 72 hours. CHEMISTRIES:  Recent Labs     21    139   K 3.8 3.3*    107   CO2 26 21   BUN 8 7   CA 8.9 8.1*   PT/INR:No results for input(s): INR, INREXT in the last 72 hours. No lab exists for component: PROTIME  APTT:No results for input(s): APTT in the last 72 hours.   LIVER PROFILE:  Recent Labs     21   AST 29   ALT 25     Lab Results   Component Value Date/Time    ALT (SGPT) 2021 08:30 PM    AST (SGOT) 2021 08:30 PM Alk. phosphatase 81 07/05/2021 08:30 PM    Bilirubin, total 0.2 07/05/2021 08:30 PM       Imaging Last 24 Hours:  No results found. Assessment//Plan   Active Problems:    Depression (7/6/2021)      Assessment & Plan  DC Zoloft secondary to side effects in the past  Start Wellbutrin  mg p.o. daily to address depression  Decrease Librium to 20 mg 3 times daily  Discussed and encouraged to transitioning to inpatient rehab.   Patient is very ambivalent and not wanting to go  Continue substance abuse groups and process groups      Current Facility-Administered Medications:     [START ON 7/9/2021] buPROPion XL (WELLBUTRIN XL) tablet 150 mg, 150 mg, Oral, 7am, Shauna Miner MD    chlordiazePOXIDE (LIBRIUM) capsule 20 mg, 20 mg, Oral, TID **AND** chlordiazePOXIDE (LIBRIUM) capsule 5 mg, 5 mg, Oral, QID, Shauna Miner MD    OLANZapine (ZyPREXA) tablet 5 mg, 5 mg, Oral, Q6H PRN, Ariel Ruiz MD    haloperidol lactate (HALDOL) injection 5 mg, 5 mg, IntraMUSCular, Q6H PRN, Shauna Miner MD    hydrOXYzine HCL (ATARAX) tablet 50 mg, 50 mg, Oral, TID PRN, Ariel Ruiz MD, 50 mg at 07/06/21 2136    traZODone (DESYREL) tablet 50 mg, 50 mg, Oral, QHS PRN, Ariel Ruiz MD, 50 mg at 07/07/21 2126    acetaminophen (TYLENOL) tablet 650 mg, 650 mg, Oral, Q4H PRN, Ariel Ruiz MD    magnesium hydroxide (MILK OF MAGNESIA) 400 mg/5 mL oral suspension 30 mL, 30 mL, Oral, DAILY PRN, Shauna Miner MD    ibuprofen (MOTRIN) tablet 400 mg, 400 mg, Oral, Q8H PRN, Shauna Miner MD    nicotine (NICODERM CQ) 21 mg/24 hr patch 1 Patch, 1 Patch, TransDERmal, DAILY, Shauna Miner MD, 1 Patch at 07/08/21 0856    losartan (COZAAR) tablet 100 mg, 100 mg, Oral, DAILY, Dallas Betts MD, 100 mg at 07/08/21 0854    albuterol (PROVENTIL HFA, VENTOLIN HFA, PROAIR HFA) inhaler 2 Puff, 2 Puff, Inhalation, Q6H PRN, Dallas Betts MD    pantoprazole (PROTONIX) tablet 40 mg, 40 mg, Oral, ACB, Dallas Betts MD, 40 mg at 07/08/21 1114  Electronically signed by Dang Esparza MD on 7/8/2021 at 8:04 PM

## 2021-07-09 NOTE — GROUP NOTE
IP  GROUP DOCUMENTATION INDIVIDUAL                                                                          Group Therapy Note    Date: 7/8/2021    Group Start Time: 2000  Group End Time: 2045  Group Topic: Recreational/Music Therapy    [facility] 2  NON ACUTE    Adeel Cedric    IP [address] [facility] GROUP DOCUMENTATION GROUP    Group Therapy Note    Attendees: 7/13  Introduced healthy leisure task skill group as positive way to cope and manage mood. Attendance: Attended    Patient's Goal:  STG: Attends activities and groups      Interventions/techniques: Art integration and Supported    Follows Directions: Followed directions    Interactions: Interacted appropriately    Mental Status: Congruent and Flat    Behavior/appearance: Attentive and Cooperative    Goals Achieved: Able to engage in interactions and Able to listen to others      Additional Notes: Attended group and actively participated. Accepted leisure packet and worked on task in group. Selected songs to listen to and interacted with staff and peers. Used leisure time constructively.      Gregory Lloyd, CTRS

## 2021-07-09 NOTE — SUICIDE SAFETY PLAN
SAFETY PLAN    A suicide Safety Plan is a document that supports someone when they are having thoughts of suicide. Warning Signs that indicate a suicidal crisis may be developing: What (situations, thoughts, feelings, body sensations, behaviors, etc.) do you experience that lets you know you are beginning to think about suicide? 1. Intense desire to drink  2. Getting triggered  3. Increased depression    Internal Coping Strategies:  What things can I do (relaxation techniques, hobbies, physical activities, etc.) to take my mind off my problems without contacting another person? 1. Work on engines  2. Go on a walk  3. Work on my hobbies    People and social settings that provide distraction: Who can I call or where can I go to distract me? 1. Name: Mayra De Anda, brother  Phone: In phone  2. Name: My Mother  Phone: In phone   3. Place: Go to the lake            4. Place: Go on a walk in the woods    People whom I can ask for help: Who can I call when I need help - for example, friends, family, clergy, someone else? 1. Name: Mayra De Anda, brother  Phone: In phone  2. Name: My Mother  Phone: In phone   3. Name: My sister  Phone: In phone    Professionals or [address] [place] [address] I can contact during a crisis: Who can I call for help - for example, my doctor, my psychiatrist, my psychologist, a mental health provider, a suicide hotline? 1. Clinician Name: [facility]   Phone: [phone]      Clinician Pager or Emergency Contact #: n/a    2. Clinician Name: Danielito Bunch    Phone: [phone]      Clinician Pager or Emergency Contact #: n/a    3. Suicide Prevention Lifeline: 9-775-828-TALK (5008)    4.  [address] [place] [address] Emergency Services -  for example, [address] [facility] suicide hotline, [facility] Hotline: Elo[facility]      Emergency Services Address: [address] [facility], [address] [place] [address]      Emergency Services Phone: [phone]    Making the environment safe: How can I make my environment (house/apartment/living space) safer? For example, can I remove guns, medications, and other items? 1. Get rid of all alcohol  2.  Keep my space more clean and comfy

## 2021-07-09 NOTE — GROUP NOTE
TARA NOGUERA GROUP DOCUMENTATION INDIVIDUAL                                                                          Group Therapy Note    Date: 7/9/2021    Group Start Time: 1500  Group End Time: 1600  Group Topic: Process Group - Inpatient    [facility] 2 BE[facility] TH ACUTE    Divya Delmaanna PACHECO  GROUP DOCUMENTATION GROUP    Group Therapy Note    Attendees: 7/12    Process: Pts were asked to share one positive thing about themselves as a check in question. Pts then discussed what brought them to the hospital, substance use treatment and what they have learned. Pts were encouraged to provide positive feedback to one another.  Pts then were asked to reflect on group         Attendance: Did not attend  Additional Notes:  Pt attended briefly but was pulled by his  and did not return     [facility]

## 2021-07-09 NOTE — GROUP NOTE
TARA  GROUP DOCUMENTATION INDIVIDUAL                                                                          Group Therapy Note    Date: 7/9/2021    Group Start Time: 1100  Group End Time: 1200  Group Topic: Process Group - Inpatient    [facility]    Palak Galloway VALERIY    IP [address] [facility] GROUP DOCUMENTATION GROUP    Group Therapy Note    Attendees: Patients encouraged to discuss the things that brought him to the hospital, the things that have been bothering them, the things that have been weighing them down. Patients encouraged to share openly and honestly and to bee respectful and supportive of their peers. Themes surrounding self judgement and controlling their emotions emerged and were discussed. Attendance: Attended    Patient's Goal:  Attends activities and groups    Interventions/techniques: Validated, Promoted peer support, Reinforced and Supported    Follows Directions: Followed directions    Interactions: Interacted appropriately    Mental Status: Calm and Congruent    Behavior/appearance: Caretaking, Cooperative and Enthusiastic    Goals Achieved: Able to engage in interactions, Able to listen to others, Able to give feedback to another, Able to reflect/comment on own behavior, Able to manage/cope with feelings, Able to receive feedback, Able to self-disclose, Discussed coping, Discussed discharge plans, Identified feelings and Identified triggers      Additional Notes:  Pt attended group and was engaged. Pt shared that he is ready to go home. Pt shared that he feels that he can detox on his own. Pt shared that he has a to of things to do and he needs to get home to get it done.     Lorenza Wilhelm

## 2021-07-09 NOTE — BH NOTES
Behavioral Health Transition Record to Provider    Patient Name: Jazmin Swanson  YOB: 1970  Medical Record Number: [id number]  Date of Admission: 7/5/2021  Date of Discharge: 7/9/21    Attending Provider: Kavin Escoto MD  Discharging Provider: Dr. Melvina Ferguson  To contact this individual call [phone] and ask the  to page. If unavailable, ask to be transferred to [address] [place] [address] Provider on call. [facility] Provider will be available on call 24/7 and during holidays. Primary Care Provider: None    No Known Allergies    Reason for Admission: Patient came to ED wanting detox reporting scared for his life due to over drinking. Patient had been struggling with his life, had been feeling overwhelmed due to his relapse on alcohol. Patient came to hospital to get detox. Admission Diagnosis: Depression [F32.9]    * No surgery found *    Results for orders placed or performed during the hospital encounter of 07/05/21   CULTURE, URINE    Specimen: Urine   Result Value Ref Range    Special Requests: No Special Requests  Reflexed from [id number]        Culture result: No Growth (<1000 cfu/mL)     SARS-COV-2   Result Value Ref Range    SARS-CoV-2 Not Detected Not Detected     CBC WITH AUTOMATED DIFF   Result Value Ref Range    WBC 6.4 4.1 - 11.1 K/uL    RBC 5.17 4.10 - 5.70 M/uL    HGB 17.4 (H) 12.1 - 17.0 g/dL    HCT 49.1 36.6 - 50.3 %    MCV 95.0 80.0 - 99.0 FL    MCH 33.7 26.0 - 34.0 PG    MCHC 35.4 30.0 - 36.5 g/dL    RDW 12.0 11.5 - 14.5 %    PLATELET 924 331 - 792 K/uL    MPV 9.9 8.9 - 12.9 FL    NRBC 0.0 0.0  WBC    ABSOLUTE NRBC 0.00 0.00 - 0.01 K/uL    NEUTROPHILS 50 32 - 75 %    LYMPHOCYTES 40 12 - 49 %    MONOCYTES 7 5 - 13 %    EOSINOPHILS 2 0 - 7 %    BASOPHILS 1 0 - 1 %    IMMATURE GRANULOCYTES 0 0 - 0.5 %    ABS. NEUTROPHILS 3.2 1.8 - 8.0 K/UL    ABS. LYMPHOCYTES 2.5 0.8 - 3.5 K/UL    ABS. MONOCYTES 0.5 0.0 - 1.0 K/UL    ABS. EOSINOPHILS 0.1 0.0 - 0.4 K/UL    ABS.  BASOPHILS 0.1 0.0 - 0.1 K/UL    ABS. IMM. GRANS. 0.0 0.00 - 0.04 K/UL    DF AUTOMATED     URINALYSIS W/ REFLEX CULTURE    Specimen: Urine   Result Value Ref Range    Color STRAW     Appearance Clear Clear      Specific gravity 1.010 1.003 - 1.030      Specific gravity 1.010 1.003 - 1.030      pH (UA) 5.0 5.0 - 8.0      Protein Negative Negative mg/dL    Glucose Normal (A) Negative mg/dL    Ketone Negative Negative mg/dL    Bilirubin Negative Negative      Blood Negative Negative      Urobilinogen Normal 0.1 - 1.0 EU/dL    Nitrites Negative Negative      Leukocyte Esterase Negative Negative      WBC <1 0 - 4 /hpf    RBC 1 0 - 5 /hpf    Bacteria Negative Negative /hpf    UA:UC IF INDICATED Urine Culture Ordered (A) Culture not indicated by UA result      Mucus Trace (A) Negative /lpf   DRUG SCREEN, URINE   Result Value Ref Range    AMPHETAMINES Negative Negative      BARBITURATES Negative Negative      BENZODIAZEPINES Negative Negative      COCAINE Negative Negative      METHADONE Negative Negative      OPIATES Negative Negative      PCP(PHENCYCLIDINE) Negative Negative      THC (TH-CANNABINOL) Negative Negative      Drug screen comment        This test is a screen for drugs of abuse in a medical setting only (i.e., they are unconfirmed results and as such must not be used for non-medical purposes, e.g.,employment testing, legal testing). Due to its inherent nature, false positive (FP) and false negative (FN) results may be obtained. Therefore, if necessary for medical care, recommend confirmation of positive findings by GC/MS.    SARS-COV-2   Result Value Ref Range    SARS-CoV-2 Please find results under separate order     ETHYL ALCOHOL   Result Value Ref Range    ALCOHOL(ETHYL),SERUM 187 (H) <89 mg/dL   METABOLIC PANEL, COMPREHENSIVE   Result Value Ref Range    Sodium 139 136 - 145 mmol/L    Potassium 3.3 (L) 3.5 - 5.1 mmol/L    Chloride 107 97 - 108 mmol/L    CO2 21 21 - 32 mmol/L    Anion gap 11 5 - 15 mmol/L    Glucose 137 (H) 65 - 100 mg/dL    BUN 7 6 - 20 mg/dL    Creatinine 0.60 (L) 0.70 - 1.30 mg/dL    BUN/Creatinine ratio 12 12 - 20      GFR est AA >60 >60 ml/min/1.73m2    GFR est non-AA >60 >60 ml/min/1.73m2    Calcium 8.1 (L) 8.5 - 10.1 mg/dL    Bilirubin, total 0.2 0.2 - 1.0 mg/dL    AST (SGOT) 29 15 - 37 U/L    ALT (SGPT) 25 12 - 78 U/L    Alk. phosphatase 81 45 - 117 U/L    Protein, total 7.3 6.4 - 8.2 g/dL    Albumin 3.6 3.5 - 5.0 g/dL    Globulin 3.7 2.0 - 4.0 g/dL    A-G Ratio 1.0 (L) 1.1 - 2.2     ETHYL ALCOHOL   Result Value Ref Range    ALCOHOL(ETHYL),SERUM 6 <65 mg/dL   METABOLIC PANEL, BASIC   Result Value Ref Range    Sodium 140 136 - 145 mmol/L    Potassium 3.8 3.5 - 5.1 mmol/L    Chloride 108 97 - 108 mmol/L    CO2 26 21 - 32 mmol/L    Anion gap 6 5 - 15 mmol/L    Glucose 110 (H) 65 - 100 mg/dL    BUN 8 6 - 20 mg/dL    Creatinine 0.59 (L) 0.70 - 1.30 mg/dL    BUN/Creatinine ratio 14 12 - 20      GFR est AA >60 >60 ml/min/1.73m2    GFR est non-AA >60 >60 ml/min/1.73m2    Calcium 8.9 8.5 - 10.1 mg/dL       Immunizations administered during this encounter: There is no immunization history on file for this patient. Screening for Metabolic Disorders for Patients on Antipsychotic Medications  (Data obtained from the EMR)    Estimated Body Mass Index  Estimated body mass index is 29.86 kg/m² as calculated from the following:    Height as of this encounter: 5' 6\" (1.676 m). Weight as of this encounter: 83.9 kg (185 lb).      Vital Signs/Blood Pressure  Visit Vitals  /85   Pulse 90   Temp 97.5 °F (36.4 °C)   Resp 18   Ht 5' 6\" (1.676 m)   Wt 83.9 kg (185 lb)   SpO2 100%   BMI 29.86 kg/m²       Blood Glucose/Hemoglobin A1c  Lab Results   Component Value Date/Time    Glucose 110 (H) 07/07/2021 06:17 AM       No results found for: HBA1C, [id number]     Lipid Panel  No results found for: CHOL, CHOLX, CHLST, CHOLV, 441459, HDL, HDLP, LDL, LDLC, DLDLP, TGLX, TRIGL, TRIGP, CHHD, CHHDX     Discharge Diagnosis: Major depressive disorder with anxiety  Alcohol dependence  Substance-induced mood disorder    Discharge Plan:  Yes, patient to discharge AMA, follow up with [facility] and [facility] for outpatient substance use treatment    Discharge Medication List and Instructions: There are no discharge medications for this patient. Unresulted Labs (24h ago, onward)    None        To obtain results of studies pending at discharge, please contact [phone]    Follow-up Information    None         Advanced Directive:   Does the patient have an appointed surrogate decision maker? No  Does the patient have a Medical Advance Directive? No  Does the patient have a Psychiatric Advance Directive? No  If the patient does not have a surrogate or Medical Advance Directive AND Psychiatric Advance Directive, the patient was offered information on these advance directives Patient will complete at a later time    Patient Instructions: Please continue all medications until otherwise directed by physician. Yes, patient to discharge AMA, follow up with [facility] and [facility] for outpatient substance use treatment    Tobacco Cessation Discharge Plan:   Is the patient a smoker and needs referral for smoking cessation? Yes  Patient referred to the following for smoking cessation with an appointment? Refused     Patient was offered medication to assist with smoking cessation at discharge? Refused  Was education for smoking cessation added to the discharge instructions? Refused    Alcohol/Substance Abuse Discharge Plan:   Does the patient have a history of substance/alcohol abuse and requires a referral for treatment? Yes  Patient referred to the following for substance/alcohol abuse treatment with an appointment? Refused  Patient was offered medication to assist with alcohol cessation at discharge? Refused  Was education for substance/alcohol abuse added to discharge instructions?  Refused    Patient discharged to Home; discussed with patient/caregiver

## 2021-07-09 NOTE — ADVANCED PRACTICE NURSE
[phone] Patient was discharged AMA with all of his belongings  He had his items from the safe, from storage & from his room. He denied any feelings of depression, anxiety, SI or HI.

## 2021-07-09 NOTE — BH NOTES
DISCHARGE SUMMARY    NAME:Vinnie Swanson  : 1970  MRN: [id number]    The patient Landy Swanson exhibits the ability to control behavior in a less restrictive environment. Patient's level of functioning is improving. No assaultive/destructive behavior has been observed for the past 24 hours. No suicidal/homicidal threat or behavior has been observed for the past 24 hours. There is no evidence of serious medication side effects. Patient has not been in physical or protective restraints for at least the past 24 hours. If weapons involved, how are they secured? Pt denies    Is patient aware of and in agreement with discharge plan? Yes, patient to discharge AMA, follow up with [facility] for outpatient substance use treatment    Arrangements for medication:  Prescriptions given to patient, given a weeks supply or 30 day supply. Copy of discharge instructions to provider?:  Yes    Arrangements for transportation home:  Pt to find own transportation home, likely brother or mother to  patient    Keep all follow up appointments as scheduled, continue to take prescribed medications per physician instructions.   Mental health crisis number:  731 or your local mental health crisis line number at [address] [facility] Emergency WARM LINE      6-768-949-MHAV (6405)      M-F: 9am to 9pm      Sat & Sun: 5pm  9pm  National suicide prevention lines:                             1-793-HAGHTWL (5-229-807-422.263.5075)       8-713-568-TALK ([phone])    Crisis Text Line:  Text HOME to 024085

## 2021-07-09 NOTE — BH NOTES
Patient up to the dayroom for meals, ate 100%. Medication compliant. No side effects, except to say that the librium makes him feel tired & sleepy. Denies any withdrawal symptoms. No tremors. VSS. Attends all groups. Social with select peers. Denies SI/HI. Denies AVH. No physical complaints voiced. Remains on CO. Librium order changed to prn. Patient verbalized understanding. Patient will be moved to the front unit Room 243.

## 2021-07-09 NOTE — BH NOTES
Patient moved to Room 243 by the door at this time. Introduced to staff & peers. Oriented to room & unit. Patient verbalized understanding.

## 2021-07-09 NOTE — GROUP NOTE
IP  GROUP DOCUMENTATION INDIVIDUAL                                                                          Group Therapy Note    Date: 7/9/2021    Group Start Time: 1100  Group End Time: 1200  Group Topic: Education Group - Inpatient    [facility] CARE MANAGEMENT    Sunday Castro    IP [facility] GROUP DOCUMENTATION GROUP    Group Therapy Note    Attendees: 6/14      Pts were encouraged to say how they were feeling as a check-in question. Pts were given Mindfulness & Meditation worksheet. Pts were encouraged to participate in a guided meditation to address the focus of breathing. Pts were then asked to share after the activity how they felt afterwards and to compare to how they felt before the meditation.        Attendance: Did not attend      Additional Notes:  Pt was encouraged to attend, but chose not to do so     Dee Frost

## 2021-07-26 NOTE — DISCHARGE SUMMARY
PSYCHIATRIC DISCHARGE SUMMARY         IDENTIFICATION:    Patient Name  Mita Rueda   Date of Birth 1970   [facility] [id number]   Medical Record Number  [id number]      Age  45 y.o. PCP None   Admit date:  7/5/2021    Discharge date: 7/9/2021   [facility]01  [facility]   Date of Service  7/9/2021            TYPE OF DISCHARGE: AMA               CONDITION AT DISCHARGE: stable       PROVISIONAL & DISCHARGE DIAGNOSES:    Major depressive disorder with anxiety  Alcohol dependence  Substance-induced mood disorder. CC & HISTORY OF PRESENT ILLNESS:  Chief complaint-was going to drink himself to death, depression.     History of Presenting Problem: Mr. Lesvia holt 58-year-old male who presents to the emergency department having increased depression and increased drinking for the past 2 months and has been feeling suicidal to drink himself to death. He expresses that he has been increasingly depressed and feeling hopeless helpless poor appetite poor sleep loss of interest and stressors relating to his family. He has not been as close with his children as he would like and his daughter has been in trouble. He denies having any auditory or visual hallucinations. Denies any command hallucinations. Not feeling paranoid.     Patient initially presented to [place] [address] and was sent to the emergency department for medical clearance and was later reported for needing an patient admission here at [address].      SOCIAL HISTORY:    Social History     Socioeconomic History    Marital status:      Spouse name: Not on file    Number of children: Not on file    Years of education: Not on file    Highest education level: Not on file   Occupational History    Not on file   Tobacco Use    Smoking status: Not on file   Substance and Sexual Activity    Alcohol use: Not on file    Drug use: Not on file    Sexual activity: Not on file   Other Topics Concern    Not on file   Social History Narrative    Not on file     Social Determinants of Health     Financial Resource Strain:     Difficulty of Paying Living Expenses:    Food Insecurity:     Worried About Running Out of Food in the Last Year:     [address] in the Last Year:    Transportation Needs:     Lack of Transportation (Medical):  Lack of Transportation (Non-Medical):    Physical Activity:     Days of Exercise per Week:     Minutes of Exercise per Session:    Stress:     Feeling of Stress :    Social Connections:     Frequency of Communication with Friends and Family:     Frequency of Social Gatherings with Friends and Family:     Attends Buddhism Services:     Active Member of Clubs or Organizations:     Attends Club or Organization Meetings:     Marital Status:    Intimate Partner Violence:     Fear of Current or Ex-Partner:     Emotionally Abused:     Physically Abused:     Sexually Abused:       FAMILY HISTORY:   No family history on file. HOSPITALIZATION COURSE:    Jacqueline Swanson was admitted to the inpatient psychiatric unit [facility] for acute psychiatric stabilization in regards to symptomatology as described in the HPI above. While on the unit Jacqueline Swanson was involved in individual, group, occupational and milieu therapy. Psychiatric medications were adjusted during this hospitalization. Jacqueline Swanson demonstrated a slow, but progressive improvement in overall condition. Much of patient's depression appeared to be related to situational stressors, effects of drugs of abuse, and psychological factors. Please see individual progress notes for more specific details regarding patient's hospitalization course. At time of discharge, Jazz Field is without significant problems of depression, psychosis, kelley. Patient free of suicidal and homicidal ideations and reports many positive predictive factors in terms of not attempting suicide or homicide. Patient with request for discharge today. There are no grounds to seek a TDO. Patient has maximized benefit to be derived from acute inpatient psychiatric treatment. All members of the treatment team concur with each other in regards to plans for discharge today per patient's request.  Patient and family are aware and in agreement with discharge and discharge plan.          LABS AND IMAGAING:    Labs Reviewed   CBC WITH AUTOMATED DIFF - Abnormal; Notable for the following components:       Result Value    HGB 17.4 (*)     All other components within normal limits   URINALYSIS W/ REFLEX CULTURE - Abnormal; Notable for the following components:    Glucose Normal (*)     UA:UC IF INDICATED Urine Culture Ordered (*)     Mucus Trace (*)     All other components within normal limits   ETHYL ALCOHOL - Abnormal; Notable for the following components:    ALCOHOL(ETHYL),SERUM 187 (*)     All other components within normal limits   METABOLIC PANEL, COMPREHENSIVE - Abnormal; Notable for the following components:    Potassium 3.3 (*)     Glucose 137 (*)     Creatinine 0.60 (*)     Calcium 8.1 (*)     A-G Ratio 1.0 (*)     All other components within normal limits   METABOLIC PANEL, BASIC - Abnormal; Notable for the following components:    Glucose 110 (*)     Creatinine 0.59 (*)     All other components within normal limits   CULTURE, URINE   SARS-COV-2   DRUG SCREEN, URINE   SARS-COV-2   ETHYL ALCOHOL     No results found for: DS35, PHEN, PHENO, PHENT, DILF, DS39, PHENY, PTN, VALF2, VALAC, VALP, VALPR, DS6, CRBAM, CRBAMP, CARB2, XCRBAM  Admission on 07/05/2021, Discharged on 07/09/2021   Component Date Value Ref Range Status    WBC 07/05/2021 6.4  4.1 - 11.1 K/uL Final    RBC 07/05/2021 5.17  4.10 - 5.70 M/uL Final    HGB 07/05/2021 17.4* 12.1 - 17.0 g/dL Final    HCT 07/05/2021 49.1  36.6 - 50.3 % Final    MCV 07/05/2021 95.0  80.0 - 99.0 FL Final    MCH 07/05/2021 33.7  26.0 - 34.0 PG Final    MCHC 07/05/2021 35.4  30.0 - 36.5 g/dL Final    RDW 07/05/2021 12.0  11.5 - 14.5 % Final    PLATELET 79/96/0790 850  150 - 400 K/uL Final    MPV 07/05/2021 9.9  8.9 - 12.9 FL Final    NRBC 07/05/2021 0.0  0.0  WBC Final    ABSOLUTE NRBC 07/05/2021 0.00  0.00 - 0.01 K/uL Final    NEUTROPHILS 07/05/2021 50  32 - 75 % Final    LYMPHOCYTES 07/05/2021 40  12 - 49 % Final    MONOCYTES 07/05/2021 7  5 - 13 % Final    EOSINOPHILS 07/05/2021 2  0 - 7 % Final    BASOPHILS 07/05/2021 1  0 - 1 % Final    IMMATURE GRANULOCYTES 07/05/2021 0  0 - 0.5 % Final    ABS. NEUTROPHILS 07/05/2021 3.2  1.8 - 8.0 K/UL Final    ABS. LYMPHOCYTES 07/05/2021 2.5  0.8 - 3.5 K/UL Final    ABS. MONOCYTES 07/05/2021 0.5  0.0 - 1.0 K/UL Final    ABS. EOSINOPHILS 07/05/2021 0.1  0.0 - 0.4 K/UL Final    ABS. BASOPHILS 07/05/2021 0.1  0.0 - 0.1 K/UL Final    ABS. IMM.  GRANS. 07/05/2021 0.0  0.00 - 0.04 K/UL Final    DF 07/05/2021 AUTOMATED    Final    Color 07/05/2021 STRAW   Final    Appearance 07/05/2021 Clear  Clear   Final    Specific gravity 07/05/2021 1.010  1.003 - 1.030   Final    Specific gravity 07/05/2021 1.010  1.003 - 1.030   Final    pH (UA) 07/05/2021 5.0  5.0 - 8.0   Final    Protein 07/05/2021 Negative  Negative mg/dL Final    Glucose 07/05/2021 Normal* Negative mg/dL Final    Ketone 07/05/2021 Negative  Negative mg/dL Final    Bilirubin 07/05/2021 Negative  Negative   Final    Blood 07/05/2021 Negative  Negative   Final    Urobilinogen 07/05/2021 Normal  0.1 - 1.0 EU/dL Final    Nitrites 07/05/2021 Negative  Negative   Final    Leukocyte Esterase 07/05/2021 Negative  Negative   Final    WBC 07/05/2021 <1  0 - 4 /hpf Final    RBC 07/05/2021 1  0 - 5 /hpf Final    Bacteria 07/05/2021 Negative  Negative /hpf Final    UA:UC IF INDICATED 07/05/2021 Urine Culture Ordered* Culture not indicated by UA result   Final    Mucus 07/05/2021 Trace* Negative /lpf Final    AMPHETAMINES 07/05/2021 Negative  Negative   Final    BARBITURATES 07/05/2021 Negative  Negative   Final    BENZODIAZEPINES 07/05/2021 Negative  Negative   Final    COCAINE 07/05/2021 Negative  Negative   Final    METHADONE 07/05/2021 Negative  Negative   Final    OPIATES 07/05/2021 Negative  Negative   Final    PCP(PHENCYCLIDINE) 07/05/2021 Negative  Negative   Final    THC (TH-CANNABINOL) 07/05/2021 Negative  Negative   Final    Drug screen comment 07/05/2021 This test is a screen for drugs of abuse in a medical setting only (i.e., they are unconfirmed results and as such must not be used for non-medical purposes, e.g.,employment testing, legal testing). Due to its inherent nature, false positive (FP) and false negative (FN) results may be obtained. Therefore, if necessary for medical care, recommend confirmation of positive findings by GC/MS. Final    Special Requests: 07/05/2021     Final                    Value:No Special Requests  Reflexed from [id number]      Culture result: 07/05/2021 No Growth (<1000 cfu/mL)    Final    SARS-CoV-2 07/05/2021 Please find results under separate order    Final    ALCOHOL(ETHYL),SERUM 07/05/2021 187* <10 mg/dL Final    Sodium 07/05/2021 139  136 - 145 mmol/L Final    Potassium 07/05/2021 3.3* 3.5 - 5.1 mmol/L Final    Chloride 07/05/2021 107  97 - 108 mmol/L Final    CO2 07/05/2021 21  21 - 32 mmol/L Final    Anion gap 07/05/2021 11  5 - 15 mmol/L Final    Glucose 07/05/2021 137* 65 - 100 mg/dL Final    BUN 07/05/2021 7  6 - 20 mg/dL Final    Creatinine 07/05/2021 0.60* 0.70 - 1.30 mg/dL Final    BUN/Creatinine ratio 07/05/2021 12  12 - 20   Final    GFR est AA 07/05/2021 >60  >60 ml/min/1.73m2 Final    GFR est non-AA 07/05/2021 >60  >60 ml/min/1.73m2 Final    Calcium 07/05/2021 8.1* 8.5 - 10.1 mg/dL Final    Bilirubin, total 07/05/2021 0.2  0.2 - 1.0 mg/dL Final    AST (SGOT) 07/05/2021 29  15 - 37 U/L Final    ALT (SGPT) 07/05/2021 25  12 - 78 U/L Final    Alk.  phosphatase 07/05/2021 81  45 - 117 U/L Final    Protein, total 07/05/2021 7.3  6.4 - 8.2 g/dL Final    Albumin 07/05/2021 3.6  3.5 - 5.0 g/dL Final    Globulin 07/05/2021 3.7  2.0 - 4.0 g/dL Final    A-G Ratio 07/05/2021 1.0* 1.1 - 2.2   Final    SARS-CoV-2 07/05/2021 Not Detected  Not Detected   Final    ALCOHOL(ETHYL),SERUM 07/06/2021 6  <10 mg/dL Final    Sodium 07/07/2021 140  136 - 145 mmol/L Final    Potassium 07/07/2021 3.8  3.5 - 5.1 mmol/L Final    Chloride 07/07/2021 108  97 - 108 mmol/L Final    CO2 07/07/2021 26  21 - 32 mmol/L Final    Anion gap 07/07/2021 6  5 - 15 mmol/L Final    Glucose 07/07/2021 110* 65 - 100 mg/dL Final    BUN 07/07/2021 8  6 - 20 mg/dL Final    Creatinine 07/07/2021 0.59* 0.70 - 1.30 mg/dL Final    BUN/Creatinine ratio 07/07/2021 14  12 - 20   Final    GFR est AA 07/07/2021 >60  >60 ml/min/1.73m2 Final    GFR est non-AA 07/07/2021 >60  >60 ml/min/1.73m2 Final    Calcium 07/07/2021 8.9  8.5 - 10.1 mg/dL Final     No results found. DISPOSITION:     Patient to f/u with drug/etoh rehabilitation, psychiatric and psychotherapy appointments. FOLLOW-UP CARE:    Activity as tolerated  Regular Diet  Wound Care: none needed. Follow-up Information     Follow up With Specialties Details Why [facility]  Call For continued care Call [phone] ask for substance use treatment    [facility]  Call in 1 day For continued care Call [phone] for IOP treatment    None    None ([phone]) Patient stated that they have no PCP      24 hour check in  Today 24 hour check in Called patient, no answer, unable to leavbe message due to VM being full                 PROGNOSIS:    [address] / [facility] ---- based on nature of patient's pathology/ies and treatment compliance issues.   Prognosis is greatly dependent upon patient's ability to remain sober and to follow up with drug/etoh rehabilitation and psychiatric/psychotherapy appointments as well as to comply with psychiatric medications as prescribed. DISCHARGE MEDICATIONS:    Informed consent given for the use of following psychotropic medications: There are no discharge medications for this patient.              Signed:  Trevon Givens MD

## 2021-11-05 NOTE — ED TRIAGE NOTES
[facility] pt stated that he is drinking himself to death; stated that he has been having increasing suicidal thoughts; stated that he is tired of living; stated that he went through a bad divorce and lost his job; pt stated that he drinks a 12 pack along with liquor every day

## 2021-11-05 NOTE — ED PROVIDER NOTES
EMERGENCY DEPARTMENT HISTORY AND PHYSICAL EXAM      Date: 11/5/2021  Patient Name: Shawnee Swanson      History of Presenting Illness     Chief Complaint   Patient presents with    Suicidal    Alcohol Problem    Alcohol intoxication       History Provided By: Patient    HPI: Donovan Crenshaw, 45 y.o. male with a past medical history significant for Depression, EtOH abuse, GERD, HTN presents to the ED with cc of suicidal ideation. States he has been drinking more, switched from beer to liquor 1mo ago and now up to nearly 1 handle of bourbon per day. Has been having increasing thoughts of harming himself but without specific plan. Does use meth, last use 2d ago. Denies hx of suicide attempts but has been hospitalized in psychiatric institution. Denies HI/AVH, recent illness such as F/C, cough, CP, SOB, head trauma, focal weakness, numbness, tingling. Does have intermittent vomiting w/EtOH abuse but not otherwise. There are no other complaints, changes, or physical findings at this time. PCP: None        Past History     Past Medical History:  No past medical history on file. Past Surgical History:  No past surgical history on file. Family History:  No family history on file. Social History:  Social History     Tobacco Use    Smoking status: Not on file    Smokeless tobacco: Not on file   Substance Use Topics    Alcohol use: Not on file    Drug use: Not on file       Allergies:  No Known Allergies      Review of Systems   Constitutional: Negative except as in HPI. Eyes: Negative except as in HPI.  ENT: Negative except as in HPI. Cardiovascular: Negative except as in HPI. Respiratory: Negative except as in HPI. Gastrointestinal: Negative except as in HPI. Genitourinary: Negative except as in HPI. Musculoskeletal: Negative except as in HPI. Integumentary: Negative except as in HPI. Neurological: Negative except as in HPI. Psychiatric: Negative except as in HPI.   Endocrine: Negative except as in HPI.  Hematologic/Lymphatic: Negative except as in HPI. Allergic/Immunologic: Negative except as in HPI. Physical Exam   Constitutional: Awake and alert, interactive, NAD  Eyes: PERRL, no injection or scleral icterus, no discharge  HEENT: NCAT, neck supple, MMM, no oropharyngeal exudates=  CV: RRR, no m/r/g  Respiratory: CTAB, no r/r/w  GI: Abd soft, nondistended, nontender  : Deferred  MSK: FROM, no joint effusions or edema  Skin: No rashes  Neuro: CN2-12 intact, symmetric facies, fluent speech. Psych: Well-groomed, normal speech, behavior, appropriate mood    Lab and Diagnostic Study Results     Labs -     Recent Results (from the past 12 hour(s))   CBC WITH AUTOMATED DIFF    Collection Time: 11/05/21  4:45 PM   Result Value Ref Range    WBC 6.0 4.1 - 11.1 K/uL    RBC 4.83 4.10 - 5.70 M/uL    HGB 16.4 12.1 - 17.0 g/dL    HCT 46.4 36.6 - 50.3 %    MCV 96.1 80.0 - 99.0 FL    MCH 34.0 26.0 - 34.0 PG    MCHC 35.3 30.0 - 36.5 g/dL    RDW 12.3 11.5 - 14.5 %    PLATELET 017 377 - 121 K/uL    MPV 9.1 8.9 - 12.9 FL    NRBC 0.0 0.0  WBC    ABSOLUTE NRBC 0.00 0.00 - 0.01 K/uL    NEUTROPHILS 45 32 - 75 %    LYMPHOCYTES 46 12 - 49 %    MONOCYTES 7 5 - 13 %    EOSINOPHILS 1 0 - 7 %    BASOPHILS 1 0 - 1 %    IMMATURE GRANULOCYTES 0 0 - 0.5 %    ABS. NEUTROPHILS 2.7 1.8 - 8.0 K/UL    ABS. LYMPHOCYTES 2.8 0.8 - 3.5 K/UL    ABS. MONOCYTES 0.4 0.0 - 1.0 K/UL    ABS. EOSINOPHILS 0.1 0.0 - 0.4 K/UL    ABS. BASOPHILS 0.1 0.0 - 0.1 K/UL    ABS. IMM.  GRANS. 0.0 0.00 - 0.04 K/UL    DF AUTOMATED     METABOLIC PANEL, COMPREHENSIVE    Collection Time: 11/05/21  4:45 PM   Result Value Ref Range    Sodium 140 136 - 145 mmol/L    Potassium 3.4 (L) 3.5 - 5.1 mmol/L    Chloride 103 97 - 108 mmol/L    CO2 27 21 - 32 mmol/L    Anion gap 10 5 - 15 mmol/L    Glucose 127 (H) 65 - 100 mg/dL    BUN 5 (L) 6 - 20 mg/dL    Creatinine 0.60 (L) 0.70 - 1.30 mg/dL    BUN/Creatinine ratio 8 (L) 12 - 20      GFR est AA >60 >60 ml/min/1.73m2    GFR est non-AA >60 >60 ml/min/1.73m2    Calcium 8.4 (L) 8.5 - 10.1 mg/dL    Bilirubin, total 0.3 0.2 - 1.0 mg/dL    AST (SGOT) 36 15 - 37 U/L    ALT (SGPT) 27 12 - 78 U/L    Alk. phosphatase 89 45 - 117 U/L    Protein, total 8.4 (H) 6.4 - 8.2 g/dL    Albumin 4.3 3.5 - 5.0 g/dL    Globulin 4.1 (H) 2.0 - 4.0 g/dL    A-G Ratio 1.0 (L) 1.1 - 2.2     ETHYL ALCOHOL    Collection Time: 11/05/21  4:45 PM   Result Value Ref Range    ALCOHOL(ETHYL),SERUM 355 (HH) <10 mg/dL       Radiologic Studies -   [unfilled]  CT Results  (Last 48 hours)      None          CXR Results  (Last 48 hours)      None            Medical Decision Making and ED Course   - I am the first and primary provider for this patient AND AM THE PRIMARY PROVIDER OF RECORD. - I reviewed the vital signs, available nursing notes, past medical history, past surgical history, family history and social history. - Initial assessment performed. The patients presenting problems have been discussed, and the staff are in agreement with the care plan formulated and outlined with them. I have encouraged them to ask questions as they arise throughout their visit. Vital Signs-Reviewed the patient's vital signs. Patient Vitals for the past 12 hrs:   Temp Pulse Resp BP SpO2   11/05/21 1633 97.8 °F (36.6 °C) (!) 106 20 (!) 160/106 99 %           Provider Notes (Medical Decision Making):     51M w/SI in setting of worsening EtOH abuse. Will get psych labs and have behavioral health evaluate and will re-evaluate when sober. Dispo pending workup, likely inpatient psych. ED Course:                Disposition     Pending behavioral evaluation. Diagnosis     Clinical Impression:   1. Suicidal ideation        Attestations:     Kurt Huston MD

## 2021-11-06 PROBLEM — F33.9 MAJOR DEPRESSION, RECURRENT (HCC): Status: ACTIVE | Noted: 2021-01-01

## 2021-11-06 NOTE — ED NOTES
Assumed care of pt from [address]. Pt in observed to be in green gown. Room psych-safe, free of ligature risk. Call bell w/in reach. Constant observer at bedside.

## 2021-11-06 NOTE — BSMART NOTE
Pt arrived at ED via private vehicle (family) and assessed in ED     Pt presented with SI w/out plan     Pt presented with disheveled appearance. Pt thought process logical    Pt cognition memory loss    Pt reports has been hospitalized multiple times (unknown)    Most Recent Hospitalizations if any: Unknown, reports past hospitalizations at [facility] V[facility] [place] and [facility] ED    Pt reports none    Pt does have a hx of legal issues. Pending hit and run charge  Pt does not have hx of violence/aggression     Pt reports Alcohol use    Pt UDS positive for: Amphetamines    Hx. Of Substance Treatment: YES  When: Unsure  Where: [facility], [facility] Level of Education: 10th     Employment: NO    Source of Income: unemployed    Housing: Homeless    Access to Weapons: NO    If weapons, Have they been removed: N/A    Trauma Hx:   Sexual: NO  When:  Not Applicable By Whom:Not Applicable    Physical: NO  When: Not Applicable By Whom:Not Applicable    Verbal: NO  When: Not Applicable By Whom:Not Applicable      Family Support: YES    Who: Brother      Dr. Tee Gill contacted and reports pt meets inpatient level of care and will be admitted to [address] [place] [address] pending medical clearance, negative Covid test, and blood alcohol level under 200. This writer notified assigned RN [address] [place] [address] Completed: N/A        PATIENT NARRATIVE SUMMARY:    Pt presented in the ED with SI, no plan or intent. Pt presented as depressed with congruent affect. His thought process was logical, however he demonstrated some memory loss. Pt continues to endorse SI and feelings of hopelessness due to the lack of housing, employment, and job loss. Pt stated, \"I'm depressed as hell. I feel like giving up\". Pt denies AVH. He reported a history of depression and anxiety. Pt reports a history of alcohol and methamphetamines abuse. Pt reports occasional use of methamphetamines. Last use was 11/3/21. He reports abusing alcohol daily.  Pt reports a history of blackouts and memory loss. Pt reported he discontinued taking mental health medications and wants to restart them. This writer will follow up as needed.

## 2021-11-06 NOTE — GROUP NOTE
[facility] GROUP DOCUMENTATION INDIVIDUAL                                                                          Group Therapy Note    Date: 11/6/2021    Group Start Time: 6371  Group End Time: 1400  Group Topic: Recreational/Music Therapy    SRM 2  NON ACUTE    [facility]    IP [address] [facility] GROUP DOCUMENTATION GROUP    Group Therapy Note    Facilitated leisure skills group to reinforce positive coping through music, social interaction, group activities and arts/crafts    Attendees: 8/10         Attendance: Attended    Patient's Goal:  Attend group daily     Interventions/techniques: Art integration and Supported    Follows Directions: Followed directions    Interactions: Interacted appropriately    Mental Status: Calm    Behavior/appearance: Cooperative    Goals Achieved: Able to engage in interactions and Able to listen to others      Additional Notes:  Receptive to listening to music and a song he selected. Interacted when prompted.  Declined to work on leisure task    Harrison Pacheco, [address]

## 2021-11-06 NOTE — BH NOTES
44 yo male voluntary admit to [address] [place] [address] under the professional care of Dr. Fiorella Parisi. Admitting dx-Major Depression. ETOH Dependence. NKA. Patient presented to the ED yesterday stated that he was drinking himself to death & having suicidal thoughts. Blood alcohol level was 355 on arrival to ED. Patient admits to drinking a liter of liquor daily. Initially denied drug use, but then stated meth use 1 to 2 times a month, with last use on Thursday. Legal issues include hit & run, anaapryl, missed his court date this past Thursday. Reports upcoming court date Nov. 15th for child support. Past medical hx-HTN & acid reflux. Tox screen positive for amphetamines. [facility] & [place]. Denies current SI. No physical complaints voiced. Placed on CO/Detox status. Body/belonging search completed. Oriented to unit & room. Remains on CO/Detox status. Continue to monitor.

## 2021-11-06 NOTE — ROUTINE PROCESS
TRANSFER - OUT REPORT:    Verbal report given to Tricia Wu RN (name) on Sarah Swanson  being transferred to [facility] (unit) for routine progression of care       Report consisted of patients Situation, Background, Assessment and   Recommendations(SBAR). Information from the following report(s) SBAR, Kardex, ED Summary, [facility] and Recent Results was reviewed with the receiving nurse. Lines:       Opportunity for questions and clarification was provided.       Patient transported with:   Tech, pt chart, personal belongings

## 2021-11-07 NOTE — H&P
[address]  HISTORY AND PHYSICAL    Name:  Dionne Downs  MR#:  [id number]  :  1970  ACCOUNT #:  [de-identified]  ADMIT DATE:  2021    This is a 54-year-old  male patient admitted to behavioral health unit voluntarily from [facility] ED.    CHIEF COMPLAINT:  \"Drinking way too much, trying to kill myself drinking to death and don't not want to live anymore, want to give up. \"    HISTORY OF PRESENT ILLNESS:  The patient with a long history of substance abuse, drinking 12 pack of beer or a liter of liquor a day. Blood alcohol was 300 plus while suicidal.  He says he will end up drinking himself to death. Does not want to kill himself. He says he is now sober. He does not feel suicidal anymore. He says he lost his job, lost his place. Some nausea. Sleeping only four hours. Energy is okay, but motivation is poor. He is drinking too much till he passed out. Also, memory problem. Currently not getting any help. He wants some medications to help with the depression. He does not want this kind of lifestyle. PAST HISTORY:  He has prior hospitalization here and other places. He was also at [address] [place] [address] some three years ago. TRAUMA HISTORY:  Denied. FAMILY HISTORY:  A brother has alcohol problem and a sister has alcohol problem. They both quit by keeping themselves busy. Physical problem, GERD. CURRENT MEDICATIONS:  None. ALLERGIES TO MEDICATIONS:  NO KNOWN ALLERGIES TO MEDICATION. MENTAL STATUS:  Average height, medium-built male patient, looks toxic, depressed, dysphoric, anxious. Memory recall is fair, even though he says probably periodic blackouts. He gave a good chronological history. No hallucinations, no delusions, no paranoia. Thoughts are linear, logical, but at times when he drinks, he becomes extremely depressed, hopeless, and anxious, but no overt tremulousness noted. Memory recall is fair.     PHYSICAL EXAMINATION: Vital signs; temperature 97.8, pulse 81, blood pressure 163/94, respirations 17, SpO2 97 on room air. LABORATORY DATA:  COVID not detected. Urinalysis:  Protein 30, ketones 5, and blood small. Culture not indicated, wbc's 0 to 4. Drug screen is positive for methamphetamine, which he agreed having taken. Ethyl alcohol level 355 mg. Metabolic panel:  Potassium 3.4, glucose 127, creatinine 0.60. Liver enzymes unremarkable. Protein 8.4, calcium 8.4. CBC unremarkable. DIAGNOSES:  Major depression, not recurrent, acute, severe without psychosis; and alcohol abuse, dependence with intoxication; withdrawal syndrome; gastroesophageal reflux disease; methamphetamine abuse. DISPOSITION:  The patient needs an inpatient level of care as he expressed suicidal thought, drinking himself to death. Close observation, detoxification, and antidepressant medication, and also placed him on Topamax. Individual therapy, group therapy, learning coping skills, stress management, substance abuse education groups. Encouraged him to go inpatient substance abuse rehabilitation. LENGTH OF STAY:  7 to 10 days.         Mehreen Carvalho MD      [id number]  D:  11/06/2021 23:10  T:  11/07/2021 11:09  JOB #:  [id number]

## 2021-11-07 NOTE — BH NOTES
Patient seen for follow-up she is in the bed but also out of her room sometimes and walking the hallway nithya mina keeps saying she needs help she needs some housing. And not intrusive not attention seeking like before not attempt to escape. Limited insight but not intrusive and attention seeking not hyper not manicky not delusional and paranoid no hallucination.   Insight poor judgment poor continued inpatient level of care indicated blood pressure 163/94 vital signs are stable labs  Reviewed no new labs resulted continued inpatient level of care indicated thank you making progress

## 2021-11-07 NOTE — BH NOTES
Dx: MDD    Affect restricted. Pt is currently on the detox protocol. 0900 CIWA 7. 12n CIWA 3. Denied hallucinations. Denied n/v; consumed 100% of meals, in the day room, with others. Appearance disheveled; provided with toiletries. Encouraged to attend groups. Observed watching tv, with others, in the day room. Q 15 mins checks maintained, for safety. 1730 Pt came to the nurse's station and reported he has diarrhea; Dr. Kathryn Morales called/informed; ordered Loperamide 4 mg p o Q Q4H prn.

## 2021-11-07 NOTE — GROUP NOTE
IP  GROUP DOCUMENTATION INDIVIDUAL Group Therapy Note Date: 11/7/2021 Group Start Time: 2019 Group End Time: 0715 Group Topic: Recreational/Music Therapy SRM 2  NON ACUTE Livia Specking 
 
IP  GROUP DOCUMENTATION GROUP Group Therapy Note Facilitated leisure skills group to reinforce positive coping through music, social interaction, group activities and arts/crafts Attendees: 9/11 Attendance: Attended Patient's Goal:  Attend group daily Interventions/techniques: Art integration and Supported Follows Directions: Followed directions Interactions: Interacted appropriately Mental Status: Calm Behavior/appearance: Cooperative Goals Achieved: Able to engage in interactions and Able to listen to others Additional Notes:  Receptive to listening to music and songs he selected while working on leisure task. Interacted with staff GAVIOTA Kohli

## 2021-11-07 NOTE — CONSULTS
Consult    Patient: Laura Cleaning MRN: [id number]  SSN: [id number]    YOB: 1970  Age: 45 y.o. Sex: male      Subjective:      Laura Cleaning is a 45 y.o. male who is being seen for medical evaluation for inpatient psychiatric admission as a history hypertension of alcohol abuse, history of tobacco use depression complains of sinus congestion    No past medical history on file. No past surgical history on file. No family history on file.   Social History     Tobacco Use    Smoking status: Not on file    Smokeless tobacco: Not on file   Substance Use Topics    Alcohol use: Not on file      Current Facility-Administered Medications   Medication Dose Route Frequency Provider Last Rate Last Admin    chlordiazePOXIDE (LIBRIUM) capsule 25 mg  25 mg Oral QID Ghanshyam SANTIAGO MD   25 mg at 11/07/21 0829    pantoprazole (PROTONIX) tablet 40 mg  40 mg Oral ACB Mesfin Morgan MD   40 mg at 11/07/21 0847    thiamine mononitrate (B-1) tablet 100 mg  100 mg Oral DAILY Salvador Curtis MD   100 mg at 73/49/27 2410    folic acid (FOLVITE) tablet 1 mg  1 mg Oral DAILY Mesfin Morgan MD   1 mg at 11/07/21 0829    metoprolol succinate (TOPROL-XL) XL tablet 50 mg  50 mg Oral DAILY Mesfin Morgan MD   50 mg at 11/07/21 0829    busPIRone (BUSPAR) tablet 15 mg  15 mg Oral TID Ghanshyam SANTIAGO MD   15 mg at 11/07/21 0829    topiramate (TOPAMAX) tablet 100 mg  100 mg Oral QHS Ghanshyam SANTIAGO MD   100 mg at 11/07/21 0848    buPROPion SR (WELLBUTRIN SR) tablet 100 mg  100 mg Oral ACB Mesfin Morgan MD   100 mg at 11/07/21 0829    losartan (COZAAR) tablet 50 mg  50 mg Oral DAILY Mesfin Morgan MD   50 mg at 11/07/21 0829    traZODone (DESYREL) tablet 100 mg  100 mg Oral QHS PRN Mesfin Morgan MD   100 mg at 11/06/21 2110    ibuprofen (MOTRIN) tablet 600 mg  600 mg Oral Q6H PRN Mesfin Morgan MD        alum-mag hydroxide-simeth (MYLANTA) oral suspension 30 mL  30 mL Oral Q4H PRN Russell Moscoso MD        LORazepam (ATIVAN) tablet 1 mg  1 mg Oral Q6H PRN Popeye Waldron MD        ondansetron (ZOFRAN ODT) tablet 4 mg  4 mg Oral Q8H PRN Russell Moscoso MD        nicotine (NICODERM CQ) 21 mg/24 hr patch 1 Patch  1 Patch TransDERmal DAILY Russell Moscoso MD   1 Patch at 11/07/21 0828        No Known Allergies    Review of Systems:  A comprehensive review of systems was negative except for that written in the History of Present Illness. Objective:     Vitals:    11/06/21 0411 11/06/21 0922 11/06/21 1930 11/07/21 0757   BP: (!) 144/87 (!) 163/94 (!) 152/105 111/74   Pulse: 75 81 74 83   Resp: 18 17 16 18   Temp: 97.9 °F (36.6 °C) 97.8 °F (36.6 °C) 98.3 °F (36.8 °C) 97.6 °F (36.4 °C)   SpO2: 96% 97%     Weight:       Height:            Physical Exam:  General:  Alert, cooperative, no distress, appears stated age. Eyes:  Conjunctivae/corneas clear. PERRL, EOMs intact. Fundi benign   Ears:  Normal TMs and external ear canals both ears. Nose: Nares normal. Septum midline. Mucosa normal. No drainage or sinus tenderness. Mouth/Throat: Lips, mucosa, and tongue normal. Teeth and gums normal.   Neck: Supple, symmetrical, trachea midline, no adenopathy, thyroid: no enlargment/tenderness/nodules, no carotid bruit and no JVD. Back:   Symmetric, no curvature. ROM normal. No CVA tenderness. Lungs:   Clear to auscultation bilaterally. Heart:  Regular rate and rhythm, S1, S2 normal, no murmur, click, rub or gallop. Abdomen:   Soft, non-tender. Bowel sounds normal. No masses,  No organomegaly. Extremities: Extremities normal, atraumatic, no cyanosis or edema. Pulses: 2+ and symmetric all extremities. Skin: Skin color, texture, turgor normal. No rashes or lesions   Lymph nodes: Cervical, supraclavicular, and axillary nodes normal.   Neurologic: CNII-XII intact. Normal strength, sensation and reflexes throughout.        No results found for this or any previous visit (from the past 24 hour(s)). Assessment:     Hospital Problems  Never Reviewed          Codes Class Noted POA    Major depression, recurrent ([facility].) ICD-10-CM: F33.9  ICD-9-CM: 296.30  11/6/2021 Unknown          Hypertension patient is on losartan and metoprolol  Alcohol abuse and dependence  Rhinitis  Tobacco use  Depression    Plan:      Add Claritin patient's medications and Sudafed    Signed By: Margaret Kaur MD     November 7, 2021

## 2021-11-07 NOTE — GROUP NOTE
[facility] GROUP DOCUMENTATION INDIVIDUAL Group Therapy Note Date: 11/7/2021 Group Start Time: 2626 Group End Time: 0834 Group Topic: Education Group - Inpatient SRM 2  NON ACUTE Charli Bartlett 
 
[facility] GROUP DOCUMENTATION GROUP Group Therapy Note Facilitated  group to introduce the definition and  benefits of diaphragmatic breathing and demonstration of  relaxation technique Attendees: 6/11 Attendance: Attended Patient's Goal:  Attend group daily Interventions/techniques: Informed and Supported Follows Directions: Followed directions Interactions: Interacted appropriately Mental Status: Calm Behavior/appearance: Cooperative Goals Achieved: Able to engage in interactions and Able to listen to others Additional Notes:  Receptive to information discussed and was able to demonstrate ability to practice diaphragmatic breathing technique. Was able to sit quietly and focus on guided imagery.  
 
Marcell De Leon, RICHIES

## 2021-11-07 NOTE — BH NOTES
PSA PART II ADDITIONAL INFORMATION        Access To Fire Arms: No    Substance Use: YES    Last Use: 11/6/2021    Type of Substance: Alcohol use and Other meth    Frequency of Use: Daily for alcohol, weekly for meth     Request to See :YES    If yes, notified : NO    Release of Information Signed: NO    Release of Information Signed For: The pts number for collateral is in his phone and will be retrieved later
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
## 2021-11-07 NOTE — BH NOTES
Shift Note:    Mr. Xochitl Mendosa was alert and oriented times 4. He denied thoughts to harm himself or others. He denied hallucinations. His CIWA was 1 and he denied anxiety or tremors. Staff will continue to monitor on every 15 minute checks. He is currently in bed with his eyes closed with even and unlabored breathing.

## 2021-11-08 NOTE — BH NOTES
Patient pleasant upon approach, affect constricted, good eye contact. Patient c/o slight nausea and ate breakfast with no problem, c/o diarrhea and received lomotol, patient was medication compliant but he refused librium for a CIWA scale of 6 and c/o feeling extremely tired. Patient rated anxiety and depression at a 5 on a scale of  0-10. He denied SI,  HI, AH and VH. Patient remains on close observation, Q 15 minute checks.

## 2021-11-08 NOTE — PROGRESS NOTES
Problem: Suicide  Goal: *STG: Remains safe in hospital  Outcome: Progressing Towards Goal     Problem: Suicide  Goal: *STG/LTG: Complies with medication therapy  Outcome: Progressing Towards Goal     Problem: Suicide  Goal: *LTG:  Identifies available community resources  Outcome: Progressing Towards Goal

## 2021-11-08 NOTE — GROUP NOTE
[facility] GROUP DOCUMENTATION INDIVIDUAL                                                                          Group Therapy Note    Date: 11/8/2021    Group Start Time: 1115  Group End Time: 200  Group Topic: Process Group - Inpatient    SRM CARE MANAGEMENT    Carla Elam BSW    [facility] GROUP DOCUMENTATION GROUP    Group Therapy Note    The facilitator checked in with the pts and had open discussion to address their time at the hospital and what they have been working on. As well as promoted feedback and processing amongst peers. Attendees: 8/12         Attendance: Attended    Patient's Goal:  Attend Group     Interventions/techniques: Provide feedback    Follows Directions: Followed directions    Interactions: Interacted appropriately    Mental Status: Anxious, Elevated and Worried    Behavior/appearance: Agitated, Cooperative, Negative, Pressured speech and Resistive/oppositional    Goals Achieved: Able to engage in interactions, Able to listen to others, Able to give feedback to another and Able to self-disclose      Additional Notes: The pt discussed withdraw and that he has been more successful detoxing on his own than being here. The writer encouraged him and warned him about the dangers of detoxing on his own. He was not willing to receive feedback but the writer told him that detoxing isn't a fun process but is necessary.      MEMO Levine

## 2021-11-08 NOTE — PROGRESS NOTES
45years old  Visit Vitals  /74   Pulse 71   Temp 97.7 °F (36.5 °C)   Resp 18   Ht 5' 6\" (1.676 m)   Wt 81.6 kg (180 lb)   SpO2 100%   BMI 29.05 kg/m²     No current facility-administered medications on file prior to encounter. No current outpatient medications on file prior to encounter. No results found for this or any previous visit (from the past 24 hour(s)).   Pression  Suicidal ideation

## 2021-11-08 NOTE — GROUP NOTE
[facility] GROUP DOCUMENTATION INDIVIDUAL                                                                          Group Therapy Note    Date: 11/8/2021    Group Start Time: 8231  Group End Time: 1400  Group Topic: Recreational/Music Therapy    SRM 2  NON ACUTE    Chevis Yohana    IP [facility] GROUP DOCUMENTATION GROUP    Group Therapy Note    Facilitated leisure skills group to reinforce positive coping through music, social interaction, group activities and arts/crafts    Attendees: 8/13         Attendance: Attended    Patient's Goal:  Attend group daily     Interventions/techniques: Art integration and Supported    Follows Directions: Followed directions    Interactions: Interacted appropriately    Mental Status: Calm    Behavior/appearance: Cooperative    Goals Achieved: Able to engage in interactions and Able to listen to others      Additional Notes:  Receptive to listening to music and songs he selected. Interacted with peers and staff. Declined to work on leisure task.     Lincoln Santizo, RICHIES

## 2021-11-08 NOTE — BH NOTES
Patient has been visible on the unit. He been in the day room watching television. He's alert & oriented X 4. He denies depression, SI, HI, AH & VH. No further diarrhea. He medication compliant. He remains on close observation for safety. No acute distress noted.

## 2021-11-08 NOTE — BH NOTES
Patient seen for follow-up patient is in bed alert awake no tremulousness no diaphoresis and sleeping well eating well some diarrhea he says he only needs Librium as needed same thing with the trazodone he wants some medication for diarrhea.   And no side effects noted's mostly detoxing continue inpatient level of care indicated encouraged to attend all the groups compliant with medication

## 2021-11-08 NOTE — GROUP NOTE
[facility]ARA  GROUP DOCUMENTATION INDIVIDUAL                                                                          Group Therapy Note    Date: 11/8/2021    Group Start Time: 0902  Group End Time: 8723  Group Topic: Community Meeting    [facility] GROUP DOCUMENTATION GROUP    Group Therapy Note    Attendees:          Attendance: Did not attend    Patient's Goal:      Interventions/techniques: Follows Directions:     Interactions:     Mental Status:     Behavior/appearance:     Goals Achieved: Patient did not attend group.       Additional Notes:      Michael Loyd

## 2021-11-08 NOTE — BH NOTES
Behavioral Health Treatment Team Note     Patient goal(s) for today: Feel better  Treatment team focus/goals: Medication management, symptom reduction     Progress note: Pt was sitting in the day room when writer approached and introduced himself. Pt was irritated but agreeable to complete the interview. Pt was disheveled but displayed adequate hygiene. Pt presented with a full affect and described his mood as \"sobering. \" Pt talked with a raspy low tone of voice and spoke at at moderated pace. Pt denies any SI/HI and AVH. Pt reports the he was on a drinking binge, but was not trying to kill himself. He stated that he has a problem with drugs and alcohol. Pt reports spending large amounts of money to get drunk without regards for his well being. Continued level of inpatient care needed for medication management, symptom reduction and group therapy. LOS:  0  Expected LOS: 5-7    Insurance info/prescription coverage: [facility] MEDICAID/[facility] HEALTHKEEPERS PLUS  Date of last family contact: Unknown  Family requesting physician contact today:  no  Discharge plan:  Pt will discuss options with his   Gagan in the home:  no   Outpatient provider(s): Services will be established prior to discharge. Participating treatment team members: Pedrito Swanson, * Tano Thomas, FRANCY intern.

## 2021-11-08 NOTE — PROGRESS NOTES
Kennymiguelito Hooper participated in spirituality group on 11/08/2021. Rev.  Hilario Bautista MDiv, [facility], [facility]   paging service: [phone])

## 2021-11-09 NOTE — GROUP NOTE
[facility]  GROUP DOCUMENTATION INDIVIDUAL                                                                          Group Therapy Note    Date: 11/9/2021    Group Start Time: 1115  Group End Time: 1150  Group Topic: Process Group - Inpatient    SRM CARE MANAGEMENT    Carla Elam BSW    [facility] GROUP DOCUMENTATION GROUP    Group Therapy Note    The facilitator encouraged the group to discuss their time at the hospital and promoted feedback and peer feedback. Attendees: 6/11          Attendance: Attended    Patient's Goal:  Attend group     Interventions/techniques: Challenged    Follows Directions: Did not follow directions    Interactions: Disorganized interaction    Mental Status: Anxious and Depressed    Behavior/appearance: Agitated, Argumentative, Disheveled, Grooming impaired, Needed prompting, Negative, Poor eye contact, Pressured speech, Resistive/oppositional and Withdrawn/quiet    Goals Achieved: Able to listen to others      Additional Notes: The pt was in an upset mood and was very negative, he was oppositional to everything the writer had to say and didn't interact with his peers.      CHANTEL CalleW

## 2021-11-09 NOTE — BH NOTES
Dr. Shana Harden notified of blood pressure and holding losartan, orders were received to hold metoprolol and losartan, and to call with SBP greater than 150.

## 2021-11-09 NOTE — BH NOTES
Patient up to groups, pleasant upon approach, denied DT's, refused librium saying \"I am passed that. \"  Patient noted with fine tremors and c/o anxiety and depression at a 7 on a scale of 0-10. Patient denied SI, HI, AH, and VH. Losartan held for blood pressure of 111/72. Metoprolol given. Dr. Marcio Paulino paged at 09:12, and at 10:20. Patient remains on close observation, Q 15 minute checks.

## 2021-11-09 NOTE — PROGRESS NOTES
45years old and depression, hypertension  Visit Vitals  /72   Pulse 69   Temp 98.1 °F (36.7 °C)   Resp 18   Ht 5' 6\" (1.676 m)   Wt 81.6 kg (180 lb)   SpO2 100%   BMI 29.05 kg/m²     No current facility-administered medications on file prior to encounter. No current outpatient medications on file prior to encounter.      Blood pressure was getting hypotensive we will discontinue blood pressure medications and place parameters

## 2021-11-09 NOTE — BH NOTES
Patient case discussed in the treatment team patient seen says his diarrhea stopped slept better with melatonin 3 mg still remains depressed apathetic not suicidal today announced he is homeless and helpless. Jobless discussed in the team and also with the patient about patient needing going to inpatient residential substance abuse program he claims to be he is motivated to do that apparently in the past he refused.   Not suicidal depressed and apathetic his vital signs today temperature 98.1 pulse 69 blood pressure 111/72 respiration 18 labs reviewed no new labs resulted

## 2021-11-09 NOTE — GROUP NOTE
IP  GROUP DOCUMENTATION INDIVIDUAL                                                                          Group Therapy Note    Date: 11/9/2021    Group Start Time: 0935  Group End Time: 1020  Group Topic: Education Group - Inpatient    SRM 2 BH NON ACUTE    Lise Laser    IP [facility] GROUP DOCUMENTATION GROUP    Group Therapy Note    Facilitated discussion focused on defining and recognizing examples of different automatic negative thoughts and how they affect moods and behaviors    Attendees: 8/11         Attendance: Attended    Patient's Goal:  Attend group daily     Interventions/techniques: Informed and Supported    Follows Directions:  Followed directions    Interactions: Interacted appropriately    Mental Status: Calm    Behavior/appearance: Cooperative    Goals Achieved: Able to engage in interactions, Able to listen to others, Able to self-disclose and Identified distorted thoughts/beliefs      Additional Notes:  Receptive to information discussed and was able to recognize he  has engaged in some of them such as \"emotional reasoning, jumping to conclusions and should statements\"  and was able to share a personal example    Kiki Guzman

## 2021-11-09 NOTE — BH NOTES
Progress note for November 8, 2021 patient case discussed with the treatment team event that led to the hospitalization recent psychiatric follow-up his needs his issues. Patient seen for follow-up he has some diarrhea he did not seek loam loperamide if anxiety he says he will take today withdrawn in bed no tremulousness noted no psychosis alert oriented somewhat low energy level isolating himself in the room did not want any groups. He says Tylenol does not help him and that he sleeps better with melatonin 3 mg started him on melatonin.   He also encouraged to go to substance abuse inpatient rehab program vital signs temperature 97.7 pulse 71 blood pressure 104/74 respiration 18 SPO2 100 room air and labs reviewed no new labs resulted to need inpatient level of care indicated still withdrawing

## 2021-11-09 NOTE — BH NOTES
Behavioral Health Treatment Team Note     Patient goal(s) for today: Stop thinking about alcohol  Treatment team focus/goals: Medication management, symptom reduction     Progress note: Pt was eating in the day room when writer approached and introduced himself. Pt was polite and talked in a raspy low tone of voice and at a slow pace. Pt was poorly groomed and displayed poor hygiene as exampled by his disheveled appearance. Pt presented with a calm affect and congruent mood. Pt denies any SI/HI and AVH. Pt reports that when he was drinking, he does not think about any of his problems. Pt stated that he had no excuse for drinking as much as he did. Continued level of inpatient care required for medication management, symptom reduction, and group therapy. LOS:  0  Expected LOS: 5-7    Insurance info/prescription coverage: [facility] MEDICAID/[facility] [employer] PLUS  Date of last family contact:  Unknown  Family requesting physician contact today:  no  Discharge plan:  Pt working towards sobriety and mood stabilization.  and Pt discussing substance use prevention programs as step down level of care. Guns in the home:  no   Outpatient provider(s):   will establish with Pt prior to discharge.     Participating treatment team members: Rafael Swanson, *  Taryn SEN, Dee Dee Ko

## 2021-11-09 NOTE — GROUP NOTE
TARA  GROUP DOCUMENTATION INDIVIDUAL                                                                          Group Therapy Note    Date: 11/9/2021    Group Start Time: 4198  Group End Time: 6755  Group Topic: AA/NA    SRM 2 BEHA TH ACUTE    Corene Halsted, Eloise    [facility] GROUP DOCUMENTATION GROUP    Group Therapy Note    Attendees: 7/11    AA/NA: Pts were asked to share what their drug of choice is as a check in question. Pts were then encouraged to share reasons as to why they use, consequences of using and where they are in their recovery. Pts were then encouraged to provide positive feedback to one another.  Pts were then encouraged to reflect on group         Attendance: Did not attend  Additional Notes:  Pt was encouraged to attend but chose not to do so     [facility]

## 2021-11-09 NOTE — GROUP NOTE
[facility] GROUP DOCUMENTATION INDIVIDUAL                                                                          Group Therapy Note    Date: 11/9/2021    Group Start Time: 3977  Group End Time: 7111  Group Topic: Recreational/Music Therapy    SRM 2  NON ACUTE    Bonny Linker    IP 1150 [facility] GROUP DOCUMENTATION GROUP    Group Therapy Note:    Facilitated leisure skills group to reinforce positive coping through music, social interaction, group activities and arts/crafts    Attendees: 6/11         Attendance: Attended    Patient's Goal:  Attend group daily     Interventions/techniques: Art integration and Supported    Follows Directions: Followed directions    Interactions: Interacted appropriately    Mental Status: Calm    Behavior/appearance: Cooperative    Goals Achieved: Able to engage in interactions and Able to listen to others      Additional Notes:  Receptive to listening to music and songs he  selected. Interacted with peers and staff.  Declined to work on leisure task    Lorenza

## 2021-11-10 NOTE — BH NOTES
Patient alert and oriented. Ambulating in room, solarium, and hallway. Took medications as ordered. Denies suicidal or homicidal ideations, denies auditory or visual hallucinations. Resting in bed quietly.

## 2021-11-10 NOTE — GROUP NOTE
TARA  GROUP DOCUMENTATION INDIVIDUAL                                                                          Group Therapy Note    Date: 11/10/2021    Group Start Time: 1030  Group End Time: 1100  Group Topic: Nursing    SRM 2  NON ACUTE    Genaro Arechiga LPN IP  GROUP DOCUMENTATION GROUP    Group Therapy Note    Attendees: 7/12         Attendance: Attended    Patient's Goal:  Id benefits of medications    Interventions/techniques: Challenged    Follows Directions: Followed directions    Interactions: Interacted appropriately    Mental Status: Calm    Behavior/appearance: Attentive    Goals Achieved: Able to listen to others      Additional Notes: Pt. Stated meds decrease negative thoughts.     Everrett Phlegm, LPN

## 2021-11-10 NOTE — BH NOTES
Pt. has been up on the unit. Joking @ times. He has eaten his meals. He reports that he would like to feel better can happen if he gets a job & a home & smile more. He has verbalized his depression & anxiety 7/10. He has denied any feelings of SI or HI. He has been attending the groups. He has been medication compliant. He has remained safe & free of falls. He remains on close observation.

## 2021-11-10 NOTE — GROUP NOTE
IP  GROUP DOCUMENTATION INDIVIDUAL                                                                          Group Therapy Note    Date: 11/10/2021    Group Start Time: 1310  Group End Time: 1556  Group Topic: Process Group - Inpatient    SRM CARE MANAGEMENT    Lianne Powers    IP 1150 [facility] GROUP DOCUMENTATION GROUP    Group Therapy Note    Attendees: 5/12    The writer facilitated a group discussion with patients regarding safety planning. Pts were encouraged to complete a suicide safety plan during group and talk about helpful coping mechanisms they have         Attendance: Attended    Patient's Goal:  Attend group    Interventions/techniques: Informed, Validated and Supported    Follows Directions: Followed directions    Interactions: Interacted appropriately    Mental Status: Calm and Congruent    Behavior/appearance: Attentive and Cooperative    Goals Achieved: Able to engage in interactions, Able to listen to others, Able to give feedback to another, Able to receive feedback and Discussed coping      Additional Notes:  Pt was attentive during group. Pt participated in the open discussion and completed their safety plan. Pt was polite and respectful throughout the group.  Pt identified needing a longer substance use program than 30 days    Bernarda Webster

## 2021-11-10 NOTE — BH NOTES
Behavioral Health Treatment Team Note     Patient goal(s) for today: 'look at treatment options '  Treatment team focus/goals: continue medication management, group therapy and discuss substance use options    Progress note: Pt presented with a flat affect and depressed mood. Pt denied SI/HI/AVH but endorsed depression and anxiety. Pt is oriented x4, polite, maintains fair eye contact and is cooperative. Pt reports that he feels 'great physically but terrible mentally'. Pt reports he feels upset that he is still drinking. Writer explained that addiction is a disease but that the pt has the option to receive help. Pt reported he is open to inpatient treatment and told writer to send out referrals and provide information about programs. Writer did so. Pt still needs an inpatient level of care due to high risk of relapse and needing a safe discharge plan    LOS:  0  Expected LOS: 5-7 days     Insurance info/prescription coverage:  [employer]   Date of last family contact:  Attempted 11/8  Family requesting physician contact today:  no  Discharge plan:   It is recommended that pt go to inpatient substance use treatment   Guns in the home:  no   Outpatient provider(s):  None at this time, will be coordinated prior to discharge     Participating treatment team members: Raafel Swanson, * (assigned SW), FRANCY Mejia

## 2021-11-10 NOTE — GROUP NOTE
IP  GROUP DOCUMENTATION INDIVIDUAL                                                                          Group Therapy Note    Date: 11/10/2021    Group Start Time: 1115  Group End Time: 1200  Group Topic: Process Group - Inpatient    SRM CARE MANAGEMENT    Adán Rivas    IP 1150 [facility] GROUP DOCUMENTATION GROUP    Group Therapy Note    Attendees: 5/12    Pts were encouraged to talk about different things that stress them out along with positive things. The writer facilitated a mindfulness processing group to talk about things that are helpful such as coping mechanisms         Attendance: Attended    Patient's Goal:  Attend group    Interventions/techniques: Informed and Validated    Follows Directions: Followed directions    Interactions: Interacted appropriately    Mental Status: Calm and Congruent    Behavior/appearance: Attentive and Cooperative    Goals Achieved: Able to engage in interactions, Able to listen to others, Able to give feedback to another, Able to reflect/comment on own behavior, Able to manage/cope with feelings and Able to self-disclose      Additional Notes:  Pt was attentive during group.  Pt participated in group discussion and was able to self-disclose    Vicky Vance

## 2021-11-11 NOTE — BH NOTES
DAYSHIFT NOTE:     Patient is up this morning and walking around the unit. Patient is pleasant on approach with an  Overall  Flat affect. Patient rated his depression and anxiety as both a 6/10 this morning. Denies SI/HI. Denies AH/VH. Denies having any withdrawal  Symptoms. Patient is medication compliant. Attends groups. Patient is noted to try to be helpful to other peers. Patient is up for his meals and snacks. Patient had a phone interview with Janny today and per the patient did not get accepted due to having [employer] insurance.  was made aware. Close observations continued to ensure patient safety.

## 2021-11-11 NOTE — GROUP NOTE
IP  GROUP DOCUMENTATION INDIVIDUAL                                                                          Group Therapy Note    Date: 11/11/2021    Group Start Time: 0904  Group End Time: 7537  Group Topic: Recreational/Music Therapy    SRM 2  NON ACUTE    Alonzo Linares    IP 1150 [facility] GROUP DOCUMENTATION GROUP    Group Therapy Note    Facilitated leisure skills group to reinforce positive coping through music, social interaction, group activities and arts/crafts    Attendees: 8/14         Attendance: Attended    Patient's Goal:  Attend group daily     Interventions/techniques: Art integration and Supported    Follows Directions: Followed directions    Interactions: Interacted appropriately    Mental Status: Calm    Behavior/appearance: Cooperative    Goals Achieved: Able to engage in interactions and Able to listen to others      Additional Notes:  Receptive to listening to music and a song he selected while working on leisure task.  Interacted with peers and staff     Lavanda Ormond

## 2021-11-11 NOTE — BH NOTES
Patient seen for follow-up no more diarrhea nausea vomiting personal hygiene grooming is better energy has improved getting out of his room well-dressed and better self-care no tremulousness still homeless encouraged to go to substance abuse inpatient rehabilitation.   Vital signs are stable labs reviewed no new labs resulted is energy improved getting out of the room going some activities

## 2021-11-11 NOTE — BH NOTES
Nurse Note:    Patient observed in the dayroom watching TV this evening. Currently denies SI, HI, A/V hallucinations. Reports anxiety and depression 7/10 and states that he will not have anywhere to go when he discharges; this writer encouraged him to talk with case management for resources. Reports eating meals and sleeping. No pain or discomfort. CIWA=0; no report of withdrawal symptoms; none observed at this time. Patient is medication compliant; flat affect. Will continue to monitor for safety. HS Topamax held.

## 2021-11-11 NOTE — GROUP NOTE
IP  GROUP DOCUMENTATION INDIVIDUAL                                                                          Group Therapy Note    Date: 11/11/2021    Group Start Time: 1227  Group End Time: 1500  Group Topic: AA/NA    SRM CARE MANAGEMENT    Martin Mayra    IP 1150 [facility] GROUP DOCUMENTATION GROUP    Group Therapy Note    Attendees: 7/10    The writer facilitated an open discussion surrounding substance use. The pts were encouraged to identify a person and potential situation where their sobriety could be influenced. Next, pts were encouraged to think of healthy coping strategies in those situations         Attendance: Attended    Patient's Goal:  Attend group    Interventions/techniques: Informed and Validated    Follows Directions: Followed directions    Interactions: Interacted appropriately    Mental Status: Calm and Congruent    Behavior/appearance: Attentive and Cooperative    Goals Achieved: Able to engage in interactions, Able to listen to others, Able to give feedback to another and Able to self-disclose      Additional Notes:  Pt was attentive during group.  Pt was respectful and shared his journey with sobridaniela Guthrie

## 2021-11-11 NOTE — PROGRESS NOTES
Problem: Suicide  Goal: *STG: Remains safe in hospital  Outcome: Progressing Towards Goal     Problem: Suicide  Goal: *STG/LTG: Complies with medication therapy  Outcome: Progressing Towards Goal     Problem: Alcohol Withdrawal  Goal: *STG: Maintains appropriate nutrition and hydration  Outcome: Progressing Towards Goal   Patient remains safe in the hospital. Denies SI, HI, A/V  hallucinations. Medication compliant and maintains appropriate nutrition and hydration.

## 2021-11-11 NOTE — GROUP NOTE
IP  GROUP DOCUMENTATION INDIVIDUAL                                                                          Group Therapy Note    Date: 11/10/2021    Group Start Time: 1685  Group End Time: 1920  Group Topic: Reflection/Relaxation    SRM 2  NON ACUTE    Lucho Salas    IP  GROUP DOCUMENTATION GROUP    Group Therapy Note    Attendees: 9/12  Facilitated structured group to introduce relaxation technique using Mandalas and instrumentals  to practice relaxation and mindfulness in group         Attendance: Attended    Patient's Goal:STG: Attends activities and groups        Interventions/techniques: Art integration and Supported    Follows Directions: Followed directions    Interactions: Interacted appropriately    Mental Status: Calm    Behavior/appearance: Attentive    Goals Achieved: Able to listen to others      Additional Notes: Attended group and actively participated in group. Was receptive to intervention. Verbalized group was positive way to relax and could do this technique outside of the hospital environment at home. Verbalized enjoyment.      Pradip San, RICHIES

## 2021-11-11 NOTE — GROUP NOTE
IP  GROUP DOCUMENTATION INDIVIDUAL                                                                          Group Therapy Note    Date: 11/11/2021    Group Start Time: 1115  Group End Time: 1200  Group Topic: Process Group - Inpatient    SRM CARE MANAGEMENT    Bang Mean    [facility] GROUP DOCUMENTATION GROUP    Group Therapy Note  Patients were encouraged to talk about challenges they are currently facing and things they have accomplished while being in the hospital. Pts were encouraged to identify deeper feelings under their actions. Pts were encouraged to self-disclose and provide peer support. Attendees: 9/14         Attendance: Attended    Patient's Goal:  Pt responded to check in question and reported that he is feeling \"great. \"     Interventions/techniques: Challenged, Informed, Validated, Provide feedback and Supported    Follows Directions: Followed directions    Interactions: Interacted appropriately    Mental Status: Congruent, Depressed and Flat    Behavior/appearance: Attentive, Cooperative and Needed prompting    Goals Achieved: Able to engage in interactions, Able to listen to others, Able to give feedback to another, Able to reflect/comment on own behavior, Able to manage/cope with feelings and Able to self-disclose      Additional Notes:  Pt was able to self disclose about his struggles with addiction and the impact his addiction has on his life. Pt was able to talk about what he envisions his life to be like if he could remain sober. Pt provided peer support and was polite.      Devin Sotelo

## 2021-11-11 NOTE — GROUP NOTE
IP  GROUP DOCUMENTATION INDIVIDUAL                                                                          Group Therapy Note    Date: 11/11/2021    Group Start Time: 0930  Group End Time: 9548  Group Topic: Education Group - Inpatient    SRM 2  NON ACUTE    Osbaldo Arteaga    IP  GROUP DOCUMENTATION GROUP    Group Therapy Note    Facilitated discussion focused on strength exploration and understanding how they are used and learning new ways to apply them    Attendees: 10/14         Attendance: Attended    Patient's Goal:  Attend group daily     Interventions/techniques: Informed and Supported    Follows Directions: Followed directions    Interactions: Interacted appropriately    Mental Status: Calm    Behavior/appearance: Cooperative    Goals Achieved: Able to engage in interactions, Able to listen to others and Able to self-disclose      Additional Notes:  Receptive to information and engaged in discussion.  Pt shared \"love, humor and gratitude\" as current strengths and was able to identify ways he was already using them such as \"love people and things in this world, making people laugh help him to smile and is grateful for life, family and friends\"    Jack Batista

## 2021-11-11 NOTE — BH NOTES
Writer spoke with pt about [facility]. They are holding a bed for the pt and he has to call to do a phone assessment with them.  Writer provided pt with the number and encouraged [facility] to [facility]

## 2021-11-12 NOTE — BH NOTES
DAYSHIFT NOTE:     Patient is up this morning and sitting in the dayroom watching television. Patient is pleasant on approach with an overall flat affect. Patient is up for his meals and snacks. Patient rates his depression and anxiety as both a 5/10. Denies SI/HI. Patient stated that he was unsure of his discharge plan and wanted a long term treatment program for alcohol. Patient stated that [place] was outpatient/short term and he needed something more long term. Patient discussed worry of currently being homeless with no where to go and it getting cold outside. Patient discussed how he was staying in a camper but he has now lost that ability and has no where to go but states, \"I will be okay. \" Patient is medication compliant. Attends groups. Interacts well amongst his peers. Close observations continued to ensure patient safety.

## 2021-11-12 NOTE — BH NOTES
Patient case discussed in the treatment team patient seen for follow-up patient's made good progress fully detoxed and self-care grooming improved energy motivation improving staying out of his room attending groups he and his  looking into substance abuse rehab opportunities apparently he was supposed to go to [facility] in [address] but told that they do not take [employer] living in looking at the [facility] grooming better not suicidal continued inpatient level of care indicated he is homeless vulnerable for relapse thank you

## 2021-11-12 NOTE — GROUP NOTE
IP  GROUP DOCUMENTATION INDIVIDUAL                                                                          Group Therapy Note    Date: 11/12/2021    Group Start Time: 0935  Group End Time: 1020  Group Topic: Education Group - Inpatient    SRM 2  NON ACUTE    Lukas Heaps    IP 1150 [facility] GROUP DOCUMENTATION GROUP    Group Therapy Note    Setting Goals/Facilitated discussion focused on taking steps to improve in their well-being and identify goals that would help to ensure follow-up    Attendees: 12/14         Attendance: Attended    Patient's Goal:  Attend group daily     Interventions/techniques: Informed and Supported    Follows Directions: Followed directions    Interactions: Interacted appropriately    Mental Status: Calm    Behavior/appearance: Cooperative    Goals Achieved: Able to engage in interactions, Able to listen to others and Able to self-disclose      Additional Notes:  Receptive to information discussed and engaged. Pt shared prior to admission on a scale of 0/poor-10/good he was taking care of himself at a \"3\".  Pt shared his goal is to  \"stop drinking, do better with his personal hygiene and think positive\"    Julianna Casillas, [address]

## 2021-11-12 NOTE — BH NOTES
Behavioral Health Treatment Team Note     Patient goal(s) for today: Go home   Treatment team focus/goals: Medication management, group therapy, symptom reduction     Progress note: Pt was sitting in the day room socializing with other patients when writer approached and introduced himself. Pt was polite and talked in a low tone of voice and slow pace. Pt was poorly groomed and displayed poor hygiene as exampled by his disheveled appearance. Pt presented with a calm affect and congruent mood. Pt denies any SI/HI and AVH's. Pt reports that he was told by his sister to say that he wanted to harm himself in order to get admitted. Pt does admit to needed alcohol abuse treatment because he allows the substance to ruin his life. Continued level of inpatient care required for medication management, symptom reduction, and group therapy. LOS:  0  Expected LOS: 5-7    Insurance info/prescription coverage: [facility] MEDICAID/[facility] HEALTHKEEPERS PLUS  Date of last family contact: Unknown   Family requesting physician contact today:  no  Discharge plan:  Pt working towards sobriety and mood stabilization.  and Pt discussing substance use prevention programs as step down level of care. Guns in the home:  no   Outpatient provider(s):   will establish with Pt prior to discharge.      Participating treatment team members: Laura Swanson, * (assigned SW), Marvin Desir Intern

## 2021-11-12 NOTE — GROUP NOTE
IP  GROUP DOCUMENTATION INDIVIDUAL                                                                          Group Therapy Note    Date: 11/12/2021    Group Start Time: 7581  Group End Time: 1200  Group Topic: Process Group - Inpatient    [facility] CARE MANAGEMENT    Joe Skinner    IP [address] [facility] GROUP DOCUMENTATION GROUP    Group Therapy Note  Pts were encouraged to talk about current stressors in their lives and identify plans to for the future. Pts were encouraged to discuss coping mechanisms for stressors. Pts were encouraged to provide peer support and self-disclose. Attendees: 9/14         Attendance: Attended    Patient's Goal:  Attend group     Interventions/techniques: Challenged, Informed, Validated, Promoted peer support, Provide feedback and Supported    Follows Directions: Followed directions    Interactions: Interacted appropriately    Mental Status: Calm and Congruent    Behavior/appearance: Attentive, Cooperative and Motivated    Goals Achieved: Able to engage in interactions, Able to listen to others, Able to give feedback to another, Able to reflect/comment on own behavior, Able to manage/cope with feelings, Able to self-disclose and Discussed coping      Additional Notes: Pt was able to self-reflect and disclose about his addiction. Pt appeared motivated to maintain sober and accomplish goals. Pt was polite and respectful.      Ivone Carrera

## 2021-11-12 NOTE — GROUP NOTE
TARA  GROUP DOCUMENTATION INDIVIDUAL                                                                          Group Therapy Note    Date: 11/12/2021    Group Start Time: 0900  Group End Time: 0930  Group Topic: [employer]    [facility]    Eric Martines    IP [address] [facility] GROUP DOCUMENTATION GROUP    Group Therapy Note    Attendees:          Attendance: Attended    Patient's Goal:      Interventions/techniques: Supported    Follows Directions:  Followed directions    Interactions: Interacted appropriately    Mental Status: Calm    Behavior/appearance: Attentive    Goals Achieved: Able to engage in interactions      Additional Notes:  Fine a long term treatment program    Марина Howe

## 2021-11-12 NOTE — PROGRESS NOTES
45years old with ago depression, alcohol withdrawal  Visit Vitals  /71 (BP 1 Location: Left upper arm, BP Patient Position: At rest;Sitting)   Pulse 73   Temp 98.2 °F (36.8 °C)   Resp 18   Ht 5' 6\" (1.676 m)   Wt 81.6 kg (180 lb)   SpO2 100%   BMI 29.05 kg/m²     No current facility-administered medications on file prior to encounter. No current outpatient medications on file prior to encounter.      Continue with current treatment

## 2021-11-12 NOTE — BH NOTES
Nurse Note:    Patient observed in the dayroom watching TV; no concerns reported and none were observed. Currently denies SI, HI, A/V hallucinations. Reports anxiety and depression 4/10. Compliant with medications. CIWA= 1; anxiety reported. No PRN medications given. Flat affect; pleasant and cooperative. Will continue to monitor for safety.

## 2021-11-12 NOTE — BH NOTES
Dr. Stuart Michelle contacted in regards to the order for losartan and it being on an automatic hold and orders received to discontinue the losartan at this time.

## 2021-11-12 NOTE — GROUP NOTE
IP  GROUP DOCUMENTATION INDIVIDUAL                                                                          Group Therapy Note    Date: 11/12/2021    Group Start Time: 6369  Group End Time: 0344  Group Topic: Recreational/Music Therapy    [facility] 2 BH NON ACUTE    Kelly Cushing    IP [address]150 [facility] GROUP DOCUMENTATION GROUP    Group Therapy Note    Facilitated leisure skills group to reinforce positive coping through music, social interaction, group activities and arts/crafts    Attendees: 11/14         Attendance: Attended    Patient's Goal:  Attend group daily     Interventions/techniques: Art integration and Supported    Follows Directions: Followed directions    Interactions: Interacted appropriately    Mental Status: Calm    Behavior/appearance: Cooperative    Goals Achieved: Able to engage in interactions and Able to listen to others      Additional Notes:  Receptive to listening to music and a song he selected while working on leisure task. Interacted with peers and staff.     Mardeen Litten, CTRS

## 2021-11-13 NOTE — GROUP NOTE
IP  GROUP DOCUMENTATION INDIVIDUAL                                                                          Group Therapy Note    Date: 11/13/2021    Group Start Time: 9545  Group End Time: 1400  Group Topic: Reflection/Relaxation    [facility] 2 BH NON ACUTE    [place] Calender     [facility] GROUP    Group Therapy Note    Attendees: 10/14    Facilitated structured group to introduce positive way to cope, manage daily stressors and promote relaxation. Attendance: Attended    Patient's Goal:  STG: Attends activities and groups        Interventions/techniques: Art integration and Supported    Follows Directions: Followed directions    Interactions: Interacted appropriately    Mental Status: Calm    Behavior/appearance: Attentive and Cooperative    Goals Achieved: Able to engage in interactions and Able to listen to others      Additional Notes: Attended group and actively participated in group. Was receptive to intervention. Verbalized group was positive way to relax. Focused on task during group.      GAVIOTA Davis

## 2021-11-13 NOTE — BH NOTES
Patient case discussed in the treatment team patient seen for follow-up he is out of his room good energy good motivation bright affect attending the groups yesterday he was declined pyramid around take his insurance today he talk to the clinic [place] people they got all the information but they have not made any commitment what they will do patient is hopeful vital signs are stable no new labs resulted upon review continue present treatment plans no side effects.   No side effect not suicidal not homicidal appetite sleep good energy motivation good continued inpatient level of care indicated we will need to get him into bed to bed interest for substance abuse rehab program

## 2021-11-13 NOTE — BH NOTES
Patient pleasant upon approach, social with peers, attending groups and was medication compliant. Patient rated anxiety and depression at a 2 on a scale of 0-10. Patient denied SI ,HI,  AH, and VH. Patient remains on close observation, Q 15 minute checks.

## 2021-11-13 NOTE — BH NOTES
Patient talkative and interactive this evening with many phone calls. Denies SI,HI and AVH's. Med-diet compliant. States he attends P[facility] and has enjoyed the groups. Affect-labile Mood-smiling and happy. Med-diet compliant. Appetite-good. Tolerates fluids well. Stays in the good [place] the [place].

## 2021-11-13 NOTE — PROGRESS NOTES
Progress Note  Date:2021       Room:[facility]  Patient Name:Vinnie Swanson     YOB: 1970     Age:51 y.o. Subjective    Subjective   Patient reports he has been feeling better with his depression. He states he is awaiting for his placement to inpatient rehab. Denies any auditory visual hallucinations no SI HI. Mental status examination-  Patient is alert, oriented x3.,  Limited eye contact  Denies any active plan of suicide or homicide  Denies any active command hallucinations. No evidence of any active psychosis. Speech is coherent soft-spoken normal tone rate volume  Insight judgment and coping remains limited  No dystonic symptoms noted    Review of Systems  Objective         Vitals Last 24 Hours:  TEMPERATURE:  Temp  Av °F (36.7 °C)  Min: 97.7 °F (36.5 °C)  Max: 98.2 °F (36.8 °C)  RESPIRATIONS RANGE: Resp  Av  Min: 18  Max: 20  PULSE OXIMETRY RANGE: No data recorded  PULSE RANGE: Pulse  Av.5  Min: 60  Max: 63  BLOOD PRESSURE RANGE: Systolic (63MNO), SSY:597 , Min:119 , ZYM:093   ; Diastolic (04RKB), HCR:97, Min:76, Max:80    I/O (24Hr): No intake or output data in the 24 hours ending 21 1138  Objective  Labs/Imaging/Diagnostics    Labs:  CBC:No results for input(s): WBC, RBC, HGB, HCT, MCV, RDW, PLT, HGBEXT, HCTEXT, PLTEXT in the last 72 hours. CHEMISTRIES:No results for input(s): NA, K, CL, CO2, BUN, CA, PHOS, MG in the last 72 hours. No lab exists for component: CREATININE, GLUCOSEPT/INR:No results for input(s): INR, INREXT in the last 72 hours. No lab exists for component: PROTIME  APTT:No results for input(s): APTT in the last 72 hours. LIVER PROFILE:No results for input(s): AST, ALT in the last 72 hours. No lab exists for component: Pamla Boot, ALKPHOS  Lab Results   Component Value Date/Time    ALT (SGPT) 27 2021 04:45 PM    AST (SGOT) 36 2021 04:45 PM    Alk.  phosphatase 89 2021 04:45 PM Bilirubin, total 0.3 11/05/2021 04:45 PM       Imaging Last 24 Hours:  No results found.   Assessment//Plan   Active Problems:    Major depression, recurrent ([facility] [address] (11/6/2021)      Assessment & Plan  Continue current medications      Current Facility-Administered Medications:     melatonin tablet 3 mg, 3 mg, Oral, QHS, Salvador Waldron MD, 3 mg at 11/12/21 2137    pseudoephedrine (SUDAFED) tablet 30 mg, 30 mg, Oral, Q8H PRN, Samir Valerio MD    fluticasone propionate (FLONASE) 50 mcg/actuation nasal spray 2 Spray, 2 Spray, Both Nostrils, DAILY, Samir Valerio MD, 2 [place] at 11/13/21 0930    chlordiazePOXIDE (LIBRIUM) capsule 25 mg, 25 mg, Oral, Q4H PRN, Salvador Waldron MD    loperamide (IMODIUM) capsule 4 mg, 4 mg, Oral, Q4H PRN, Darrius SANTIAGO MD, 4 mg at 11/08/21 1314    pantoprazole (PROTONIX) tablet 40 mg, 40 mg, Oral, ACB, Kedar Taylor MD, 40 mg at 11/13/21 0932    thiamine mononitrate (B-1) tablet 100 mg, 100 mg, Oral, DAILY, Kedar Taylor MD, 100 mg at 32/81/82 1139    folic acid (FOLVITE) tablet 1 mg, 1 mg, Oral, DAILY, Darrius SANTIAGO MD, 1 mg at 11/13/21 0932    metoprolol succinate (TOPROL-XL) XL tablet 50 mg, 50 mg, Oral, DAILY, Ayde HERNANDEZ MD, 50 mg at 11/13/21 0932    busPIRone (BUSPAR) tablet 15 mg, 15 mg, Oral, TID, Kedar Taylor MD, 15 mg at 11/13/21 0930    [Held by provider] topiramate (TOPAMAX) tablet 100 mg, 100 mg, Oral, QHS, Kedar Taylor MD, 100 mg at 11/08/21 2122    buPROPion SR (WELLBUTRIN SR) tablet 100 mg, 100 mg, Oral, ACB, Kedar Taylor MD, 100 mg at 11/13/21 0932    ibuprofen (MOTRIN) tablet 600 mg, 600 mg, Oral, Q6H PRN, Salvador Waldron MD    alum-mag hydroxide-simeth (MYLANTA) oral suspension 30 mL, 30 mL, Oral, Q4H PRN, Salvador Waldron MD    LORazepam (ATIVAN) tablet 1 mg, 1 mg, Oral, Q6H PRN, Salvador Waldron MD    ondansetron (ZOFRAN ODT) tablet 4 mg, 4 mg, Oral, Q8H PRN, Kedar Taylor MD    nicotine (Alejandra Daniels) 21 mg/24 hr patch 1 Patch, 1 Patch, TransDERmal, DAILY, Hunter Balderrama MD, 1 Patch at 11/13/21 0939  Electronically signed by Paulette Mckeon MD on 11/13/2021 at 11:38 AM

## 2021-11-13 NOTE — GROUP NOTE
TARA  GROUP DOCUMENTATION INDIVIDUAL                                                                          Group Therapy Note    Date: 11/13/2021    Group Start Time: 0930  Group End Time: 1020  Group Topic: Education Group - Inpatient    [facility] [address] GROUP    Group Therapy Note    Attendees: 10/14    Facilitated group discussion on The Trauma Response with discussion related to the brain, triggers, current stressors and positive ways to cope. Attendance: Attended    Patient's Goal:  STG: Attends activities and groups      Interventions/techniques: Informed, Provide feedback and Supported    Follows Directions: Followed directions    Interactions: Interacted appropriately    Mental Status: Calm and Flat    Behavior/appearance: Attentive and Cooperative    Goals Achieved: Able to engage in interactions and Able to listen to others      Additional Notes: Attended group and actively engaged in group discussion. Was receptive to intervention and participated in discussion with cues and encouragement. PT was able to identify symptoms of anxiety/stress, and ways to cope. PT identified current stressors as money and bills. Pt also able to identify positive ways to cope to include being productive.     Amee Domínguez, CTRS

## 2021-11-13 NOTE — GROUP NOTE
[facility] GROUP DOCUMENTATION INDIVIDUAL                                                                          Group Therapy Note    Date: 11/13/2021    Group Start Time: 1100  Group End Time: 2296  Group Topic: Nursing    [facility] 2 BEHA HLTH ACUTE    Rohan Doty, RN    IP [address] [facility] GROUP DOCUMENTATION GROUP    Group Therapy Note    Nursing Education : Anger Management &   Community Meeting         Attendance: Attended    Patient's Goal:      Interventions/techniques: Promoted peer support, Provide feedback, Reinforced and Supported    Follows Directions:  Followed directions    Interactions: Interacted appropriately    Mental Status: Happy    Behavior/appearance: Attentive and Cooperative    Goals Achieved: Able to engage in interactions, Able to listen to others, Able to give feedback to another, Able to reflect/comment on own behavior, Able to manage/cope with feelings, Able to receive feedback, Able to experience relief/decrease in symptoms and Able to self-disclose      Additional Notes:      Cielo Garcia RN

## 2021-11-14 NOTE — BH NOTES
Patient is pleasant, calm and cooperative. Pt out of room until day room was closed at 11pm watching tv and socializing with peers. Pt was med compliant this evening, with no PRN's required or requested by patient. Pt rated his depression a 3/10, his anxiety also 3/10. He denies SI, HI, and hallucinations. Pt currently sleeping now, no distress noted, respirations regular and unlabored. Will continue to monitor patient every 15 mins as per unit protocol.

## 2021-11-14 NOTE — GROUP NOTE
IP  GROUP DOCUMENTATION INDIVIDUAL                                                                          Group Therapy Note    Date: 11/14/2021    Group Start Time: 2187  Group End Time: 1886  Group Topic: Recreational/Music Therapy    [facility] 2 BH NON ACUTE    Mima Seek    IP 1[address] [facility] GROUP DOCUMENTATION GROUP    Group Therapy Note    Attendees: 11/14  Facilitated structured group to introduce healthy leisure task skill as positive way to cope and manage stress. Attendance: Attended    Patient's Goal: STG: Attends activities and groups      Interventions/techniques: Art integration and Supported    Follows Directions: Followed directions    Interactions: Interacted appropriately    Mental Status: Calm    Behavior/appearance: Attentive    Goals Achieved: Able to engage in interactions and Able to listen to others      Additional Notes: Attended group and actively participated. Received leisure packet. Worked on task in group and selected songs to listen to with peers. Was receptive to intervention and used time constructively.      Cris Escamilla , CTRS

## 2021-11-14 NOTE — PROGRESS NOTES
Problem: Suicide  Goal: *STG: Seeks staff when feelings of self harm or harm towards others arise  Outcome: Progressing Towards Goal  Goal: *STG/LTG: Complies with medication therapy  Outcome: Progressing Towards Goal     Problem: Alcohol Withdrawal  Goal: *STG: Remains safe in hospital  Outcome: Progressing Towards Goal  Goal: *STG: Complies with medication therapy  Outcome: Progressing Towards Goal  Goal: *STG: Attends activities and groups  Outcome: Progressing Towards Goal     Problem: Depressed Mood (Adult/Pediatric)  Goal: *STG: Remains safe in hospital  Outcome: Progressing Towards Goal  Goal: *STG: Complies with medication therapy  Outcome: Progressing Towards Goal

## 2021-11-14 NOTE — PROGRESS NOTES
45years old with psychosis with major depression waiting for inpatient rehab presently on Topamax BuSpar and Wellbutrin  Visit Vitals  /75   Pulse (!) 53   Temp 98.2 °F (36.8 °C)   Resp 18   Ht 5' 6\" (1.676 m)   Wt 81.6 kg (180 lb)   SpO2 100%   BMI 29.05 kg/m²     Assessment and plan  Continue with present treatment  Patient has bradycardia on Lopressor will adjust Lopressor

## 2021-11-14 NOTE — PROGRESS NOTES
Progress Note  Date:2021       Room:[facility]  Patient Name:Vinnie Swanson     YOB: 1970     Age:51 y.o. Subjective    Subjective  Patient reports he has been feeling better. No side effects from medications. His mood has been fair. He is waiting to transition to inpatient rehab. Mental status examination-  Patient is alert, oriented to name place person  Speech is coherent, soft-spoken, no flight of ideas, no loosening of associations   Mood states okay  No active suicidal or homicidal ideations  Denies any auditory or visual hallucinations  No evidence of any active psychosis  Insight fair  Judgment limited    Review of Systems  Objective         Vitals Last 24 Hours:  TEMPERATURE:  Temp  Av.3 °F (36.8 °C)  Min: 98.2 °F (36.8 °C)  Max: 98.3 °F (36.8 °C)  RESPIRATIONS RANGE: Resp  Av  Min: 18  Max: 20  PULSE OXIMETRY RANGE: No data recorded  PULSE RANGE: Pulse  Av.5  Min: 53  Max: 56  BLOOD PRESSURE RANGE: Systolic (80ONO), GLH:795 , Min:122 , TME:484   ; Diastolic (54VTN), JFH:47, Min:75, Max:80    I/O (24Hr): No intake or output data in the 24 hours ending 21 1020  Objective  Labs/Imaging/Diagnostics    Labs:  CBC:No results for input(s): WBC, RBC, HGB, HCT, MCV, RDW, PLT, HGBEXT, HCTEXT, PLTEXT in the last 72 hours. CHEMISTRIES:No results for input(s): NA, K, CL, CO2, BUN, CA, PHOS, MG in the last 72 hours. No lab exists for component: CREATININE, GLUCOSEPT/INR:No results for input(s): INR, INREXT in the last 72 hours. No lab exists for component: PROTIME  APTT:No results for input(s): APTT in the last 72 hours. LIVER PROFILE:No results for input(s): AST, ALT in the last 72 hours. No lab exists for component: Kandy Bleacher, ALKPHOS  Lab Results   Component Value Date/Time    ALT (SGPT) 27 2021 04:45 PM    AST (SGOT) 36 2021 04:45 PM    Alk.  phosphatase 89 2021 04:45 PM    Bilirubin, total 0.3 11/05/2021 04:45 PM       Imaging Last 24 Hours:  No results found.   Assessment//Plan   Active Problems:    Major depression, recurrent ([facility] [address] (11/6/2021)      Assessment & Plan  Continue current medications  No side effects from medications reported  Psychoeducation provided      Current Facility-Administered Medications:     melatonin tablet 3 mg, 3 mg, Oral, QHS, Salvador Waldron MD, 3 mg at 11/13/21 2103    pseudoephedrine (SUDAFED) tablet 30 mg, 30 mg, Oral, Q8H PRN, Samir Valerio MD    fluticasone propionate (FLONASE) 50 mcg/actuation nasal spray 2 Spray, 2 Spray, Both Nostrils, DAILY, Samir Valerio MD, 2 [place] at 11/14/21 0848    chlordiazePOXIDE (LIBRIUM) capsule 25 mg, 25 mg, Oral, Q4H PRN, Salvador Waldron MD    loperamide (IMODIUM) capsule 4 mg, 4 mg, Oral, Q4H PRN, Ayaka SANTIAGO MD, 4 mg at 11/08/21 1314    pantoprazole (PROTONIX) tablet 40 mg, 40 mg, Oral, ACB, Estrella Yoo MD, 40 mg at 11/14/21 0850    thiamine mononitrate (B-1) tablet 100 mg, 100 mg, Oral, DAILY, Estrella Yoo MD, 100 mg at 62/57/63 7445    folic acid (FOLVITE) tablet 1 mg, 1 mg, Oral, DAILY, Estrella Yoo MD, 1 mg at 11/14/21 0850    metoprolol succinate (TOPROL-XL) XL tablet 50 mg, 50 mg, Oral, DAILY, Samir Valerio MD, 50 mg at 11/13/21 0932    busPIRone (BUSPAR) tablet 15 mg, 15 mg, Oral, TID, Estrella Yoo MD, 15 mg at 11/14/21 0850    [Held by provider] topiramate (TOPAMAX) tablet 100 mg, 100 mg, Oral, QHS, Estrella Yoo MD, 100 mg at 11/08/21 2122    buPROPion SR (WELLBUTRIN SR) tablet 100 mg, 100 mg, Oral, ACB, Estrella Yoo MD, 100 mg at 11/14/21 0850    ibuprofen (MOTRIN) tablet 600 mg, 600 mg, Oral, Q6H PRN, Salvador Waldron MD    alum-mag hydroxide-simeth (MYLANTA) oral suspension 30 mL, 30 mL, Oral, Q4H PRN, Salvador Waldron MD    LORazepam (ATIVAN) tablet 1 mg, 1 mg, Oral, Q6H PRN, Salvador Waldron MD    ondansetron (ZOFRAN ODT) tablet 4 mg, 4 mg, Oral, Q8H PRN, Gisele Villagran MD    nicotine (NICODERM CQ) 21 mg/24 hr patch 1 Patch, 1 Patch, TransDERmal, DAILY, Gisele Villagran MD, 1 Patch at 11/14/21 1954  Electronically signed by Tono Polo MD on 11/14/2021 at 10:20 AM

## 2021-11-14 NOTE — GROUP NOTE
[facility] GROUP DOCUMENTATION INDIVIDUAL                                                                          Group Therapy Note    Date: 11/14/2021    Group Start Time: 0930  Group End Time: 8191  Group Topic: Education Group - Inpatient    [facility] 2  NON ACUTE    [address] GROUP    Group Therapy Note    Attendees:9/14    Facilitated structured group to introduce Mindfulness and Meditation as positive way to cope and manage daily stressors. Introduced relaxation technique using Guided Imagery to practice relaxation in group. Attendance: Attended    Patient's Goal:  STG: Attends activities and groups        Interventions/techniques: Informed, Provide feedback and Supported    Follows Directions: Followed directions    Interactions: Interacted appropriately    Mental Status: Calm    Behavior/appearance: Attentive    Goals Achieved: Able to engage in interactions      Additional Notes: Attended group and participated with encouragement. Received materials provided. Listened to guided imagery technique. Was receptive to intervention. Was able to focus on skill entire session.       Gaurav Ridley, CTRS

## 2021-11-15 NOTE — BH NOTES
DX: MDD    Affect full/bright. Mikhail Batres he is expecting his CM to find him some place to go for SA tx. Denied having thoughts to self harm. Accepted scheduled meds. Consumed 100% of meals, in the day room. Observed interacting frrely/appropriately, with others. Showered and changed his clothes, . Encouraged to cont to attend groups. Q 15 mins checks maintained, for safety.

## 2021-11-15 NOTE — GROUP NOTE
[facility] GROUP DOCUMENTATION INDIVIDUAL                                                                          Group Therapy Note    Date: 11/15/2021    Group Start Time: 3448  Group End Time: 1400  Group Topic: Recreational/Music Therapy    SRM 2  NON ACUTE    [facility]    IP [address] [facility] GROUP DOCUMENTATION GROUP    Group Therapy Note    Facilitated leisure skills group to reinforce positive coping through music, social interaction, group activities and arts/crafts    Attendees: 12/14         Attendance: Attended    Patient's Goal:  Attend group daily     Interventions/techniques: Art integration and Supported    Follows Directions: Followed directions    Interactions: Interacted appropriately    Mental Status: Calm    Behavior/appearance: Cooperative    Goals Achieved: Able to engage in interactions and Able to listen to others      Additional Notes:  Receptive to listening to music and a song he selected while working on leisure task.  Interacted with peers and staff     Floyd Morning

## 2021-11-15 NOTE — PROGRESS NOTES
Patient actively participated in Spirituality Group in the non-acute Behavioral Unit on 11/15. Chaplains will follow up if further referrals are requested. Chaplain Justus Frazier M.Div.    can be reached by calling the  at [facility]  [phone]

## 2021-11-15 NOTE — GROUP NOTE
IP  GROUP DOCUMENTATION INDIVIDUAL                                                                          Group Therapy Note    Date: 11/15/2021    Group Start Time: 0940  Group End Time: 3165  Group Topic: Education Group - Inpatient    SRM 2 BH NON ACUTE    [facility]    [facility] GROUP DOCUMENTATION GROUP    Group Therapy Note    Facilitated group to introduce information on the benefits of having a positive mental attitude and how it correlates with self-esteem    Attendees: 9/14         Attendance: Attended    Patient's Goal:  Attend group daily     Interventions/techniques: Informed and Supported    Follows Directions:  Followed directions    Interactions: Interacted appropriately    Mental Status: Calm    Behavior/appearance: Cooperative    Goals Achieved: Able to engage in interactions, Able to listen to others, Able to self-disclose, Discussed coping and Discussed self-esteem issues      Additional Notes:  Receptive to information discussed and was able share something positive he do well such as \"fixing things\" and shared he can help himself stay positive by \"staying away from negative people\"    Luke Wick, CTRS

## 2021-11-15 NOTE — PROGRESS NOTES
Problem: Suicide  Goal: *STG/LTG: Complies with medication therapy  Outcome: Progressing Towards Goal     Problem: Alcohol Withdrawal  Goal: *STG: Remains safe in hospital  Outcome: Progressing Towards Goal  Goal: *STG: Complies with medication therapy  Outcome: Progressing Towards Goal  Goal: *STG: Attends activities and groups  Outcome: Progressing Towards Goal

## 2021-11-15 NOTE — BH NOTES
Patient is pleasant, calm and cooperative. Pt seen on unit, watching tv and socializing with peers. Pt was med compliant this evening, with request for something for his anxiety to help him sleep better. Pt rated his depression a 2/10, his anxiety goes up to 6-7/10 when trying to go to bed. Says his mind starts racing at night and he can't sleep. He denies SI, HI, and hallucinations. Pt currently sleeping now, appears that xanax worked for anxiety and sleep, no distress noted, respirations regular and unlabored.  Will continue to monitor patient every 15 mins as per unit protocol

## 2021-11-15 NOTE — PROGRESS NOTES
45years old with psychosis with major depression waiting for inpatient rehab presently on Topamax BuSpar and Wellbutrin  Visit Vitals  /78 (BP 1 Location: Left upper arm, BP Patient Position: At rest;Sitting)   Pulse 70   Temp 97.9 °F (36.6 °C)   Resp 18   Ht 5' 6\" (1.676 m)   Wt 81.6 kg (180 lb)   SpO2 100%   BMI 29.05 kg/m²     Assessment and plan  Continue with present treatment  Patient has bradycardia on Lopressor will adjust Lopressor

## 2021-11-15 NOTE — PROGRESS NOTES
[address] SANDERS attended Goals group with staff and peers.   He stated that his goals was to get into a long term program with the assistance of his counselor

## 2021-11-15 NOTE — BH NOTES
Behavioral Health Treatment Team Note     Patient goal(s) for today: Find housing  Treatment team focus/goals: Medication management, group therapy    Progress note: Pt was a group when writer requested to speak with him. Pt was polite and talked in a low tone of voice and slow pace. Pt was poorly groomed but displayed good hygiene. Pt presented with a calm affect and congruent mood. Pt denies any SI/HI and AVH's. Pt reports that he needs to figure out where he is going to live and how to stop drinking so much alcohol. Pt reports that he is having family issues with a relative who took over the property where he chavar[place] his RV. Pt states that he does not drink until he becomes anxious and worries about things. Continued level of inpatient care required for medication management, symptom reduction, and group therapy. LOS:  0  Expected LOS: 5-7    Insurance info/prescription coverage: [facility] MEDICAID/[facility] HEALTHKEEPERS PLUS  Date of last family contact: Unknown  Family requesting physician contact today:  no  Discharge plan: Pt working towards sobriety and mood stabilization.  and Pt discussing substance use prevention programs as step down level of care. Guns in the home: no   Outpatient provider(s):  will establish with Pt prior to discharge.      Participating treatment team members: Shabbir Swanson, * (assigned SW), Naty Flores Intern

## 2021-11-15 NOTE — GROUP NOTE
IP  GROUP DOCUMENTATION INDIVIDUAL                                                                          Group Therapy Note    Date: 11/15/2021    Group Start Time: 1100  Group End Time: 1200  Group Topic: Process Group - Inpatient    SRM CARE MANAGEMENT    Sho Lindo    IP [address] [facility] GROUP DOCUMENTATION GROUP    Group Therapy Note Facilitator encouraged group to discuss experiences of their past that may have influenced their trauma. Group members supported as they expressed their feelings. Members were educated on new skills to practice when they are experiencing increased anxiety. Group members were challenged to rethink and replace old skills they used previously to combat anxiety with the new skills they learned in the group. Attendees: 11/14         Attendance: Attended    Patient's Goal:  Remain sober    Interventions/techniques: Informed, Validated, Promoted peer support, Provide feedback, Reinforced and Supported    Follows Directions: Followed directions    Interactions: Interacted appropriately    Mental Status: Calm and Worried    Behavior/appearance: Attentive and Cooperative    Goals Achieved: Able to engage in interactions, Able to listen to others, Able to give feedback to another, Able to reflect/comment on own behavior, Able to self-disclose, Displayed empathy and Identified feelings      Additional Notes:  Pt supported other group members as they discussed personal information.      Lowell Taylor

## 2021-11-16 NOTE — GROUP NOTE
IP  GROUP DOCUMENTATION INDIVIDUAL                                                                          Group Therapy Note    Date: 11/16/2021    Group Start Time: 0930  Group End Time: 6385  Group Topic: Education Group - Inpatient    SRM 2 BH NON ACUTE    Bonny Linker    IP [address] [facility] GROUP DOCUMENTATION GROUP    Group Therapy Note    Facilitated discussion focused on being aware of different feelings and their triggers and changes that need to be made to experience more comfortable feelings and less uncomfortable feelings    Attendees: 9/11         Attendance: Attended    Patient's Goal:  Attend group daily     Interventions/techniques: Informed and Supported    Follows Directions: Followed directions    Interactions: Interacted appropriately    Mental Status: Calm    Behavior/appearance: Cooperative    Goals Achieved: Able to engage in interactions, Able to listen to others, Able to self-disclose, Discussed coping, Identified feelings and Identified triggers      Additional Notes:  Receptive to information and engaged. Pt was able to identify different feelings experienced and its triggers.  Pt shared feeling \"hopeless, sad and worthless\" prior to admission  because of \"not working, letting people down, past issues, no money, and drinking alcohol\" and currently feeling \"happy and confident \" being sober, thinking clearly and feeling motivated\"  Pt shared in order to feel more comfortable feelings need to \"continue to take his medications, find a place to stay, go to treatment so he can work on staying sober, continue to attend NA/AA meetings and find a job\"     Hunter Poster, [address]

## 2021-11-16 NOTE — PROGRESS NOTES
Problem: Suicide  Goal: *STG: Seeks staff when feelings of self harm or harm towards others arise  Outcome: Progressing Towards Goal  Goal: *STG/LTG: Complies with medication therapy  Outcome: Progressing Towards Goal

## 2021-11-16 NOTE — GROUP NOTE
[facility]ARA  GROUP DOCUMENTATION INDIVIDUAL                                                                          Group Therapy Note    Date: 11/16/2021    Group Start Time: 4270  Group End Time: 2565  Group Topic: Recreational/Music Therapy    SRM 2  NON ACUTE    Abby Pisano    IP [facility] GROUP DOCUMENTATION GROUP    Group Therapy Note    Facilitated leisure skills group to reinforce positive coping through music, social interaction, group activities and arts/crafts    Attendees: 9/12         Attendance: Attended    Patient's Goal:  Attend group daily     Interventions/techniques: Art integration and Supported    Follows Directions: Followed directions    Interactions: Interacted appropriately    Mental Status: Calm    Behavior/appearance: Cooperative    Goals Achieved: Able to engage in interactions and Able to listen to others      Additional Notes:  Receptive to listening to music and a song he selected while working on leisure task.  Interacted when prompted    Fortino Hurt, [address]

## 2021-11-16 NOTE — GROUP NOTE
IP  GROUP DOCUMENTATION INDIVIDUAL                                                                          Group Therapy Note    Date: 11/16/2021    Group Start Time: 1100  Group End Time: 1200  Group Topic: Process Group - Inpatient    SRM CARE MANAGEMENT    Jamee Eng    IP [address] [facility] GROUP DOCUMENTATION GROUP    Group Therapy Note  Pt's were encouraged to talk about past methods they used to help them manage their stress and lower their anxiety. Writer facilitated role playing activities with the group as a way to teach them new stress reduction techniques. Writer supported Pt's as they practiced the use of new skills learned. Pt were encouraged to think of other ways they can create safe spaces when they leave the hospital.    Attendees: 10/11         Attendance: Attended    Patient's Goal:  Remain sober     Interventions/techniques: Challenged, Informed, Validated and Supported    Follows Directions: Followed directions    Interactions: Interacted appropriately    Mental Status: Calm    Behavior/appearance: Attentive, Cooperative, Enthusiastic and Motivated    Goals Achieved: Able to engage in interactions, Able to listen to others, Able to give feedback to another, Able to receive feedback, Able to self-disclose, Discussed coping, Displayed empathy and Identified feelings      Additional Notes: Pt talked about his history of alcohol use.      Juventino Maciel

## 2021-11-16 NOTE — BH NOTES
Behavioral Health Treatment Team Note     Patient goal(s) for today: Find housing   Treatment team focus/goals: Medication management, group therapy    Progress note: Pt was sitting in the day room coloring and socializing with other patients, when writer approached and introduced himself. Pt was polite and talked in a low tone of voice and moderate pace. Pt was adequately groomed but displayed good hygiene. Pt presented with a happy affect and congruent mood. Pt denies any SI/HI and AVH's. Pt reports that he will be glad when temporary housing if found because he wants to get on with his life. Pt reports that if he is accepted to housing in [place], then he will start looking for work as soon as he gets there. Continued level of inpatient care required for medication management, symptom reduction, and group therapy.     LOS:  0  Expected LOS: 5-7    Insurance info/prescription coverage: [facility] MEDICAID/[facility] HEALTHKEEPERS PLUS  Date of last family contact:  11/14/2021  Family requesting physician contact today: no  Discharge plan: Pt working towards sobriety and mood stabilization.  and Pt discussing substance use prevention programs as step down level of care.   Guns in the home: no   Outpatient provider(s):  will establish with Pt prior to discharge.     Participating treatment team members: Isaac Swanson, * (assigned SW), Alfie ROLAND Estimable Intern

## 2021-11-16 NOTE — PROGRESS NOTES
45years old with psychosis with major depression waiting for inpatient rehab presently on Topamax BuSpar and Wellbutrin  Visit Vitals  /88   Pulse 67   Temp 97.9 °F (36.6 °C)   Resp 18   Ht 5' 6\" (1.676 m)   Wt 81.6 kg (180 lb)   SpO2 99%   BMI 29.05 kg/m²     Assessment and plan  Continue with present treatment  Patient has bradycardia on Lopressor will adjust Lopressor  Bradycardia improved

## 2021-11-16 NOTE — BH NOTES
DAYSHIFT NOTE:     Patient is up this morning and walking around the unit. Patient is pleasant on approach and states that he is still waiting on his plan for rehab and figuring out where he is going to be going. Patient states \"not really\" when asked if he was depressed and or anxious. Denies SI/HI. Denies AH/VH. Patient is medication compliant. Attends groups. Patient tries to be helpful to other peers on the unit. Patient is anticipating discharge tomorrow to [facility]. Patient is up for his meals and snacks. Close observations continued to ensure patient safety.

## 2021-11-17 NOTE — PROGRESS NOTES
Problem: Suicide  Goal: *STG: Seeks staff when feelings of self harm or harm towards others arise  Outcome: Progressing Towards Goal     Problem: Suicide  Goal: *STG/LTG: Complies with medication therapy  Outcome: Progressing Towards Goal     Problem: Alcohol Withdrawal  Goal: *STG: Complies with medication therapy  Outcome: Progressing Towards Goal

## 2021-11-17 NOTE — BH NOTES
NURSING DISCHARGE NOTE    Discharged to go to rehab [facility]). Affect full. Denied feeling depressed, anxious, and/or suicidal. Rx called in to [facility] by Dr. Pricila Mendoza. Took all personal belongings, and valuables. Escorted down by EDWARD Watts.

## 2021-11-17 NOTE — BH NOTES
Patient has been vision on the unit, in the day room, watching television. His affect is flat, he preoccuiped. He denies depression, SI, HI, AH & VH. He remains on close observation for safety.

## 2021-11-17 NOTE — BH NOTES
Patient case discussed in the treatment team patient seen patient doing well no withdrawal symptoms sometimes asking for benzodiazepines in from the 8 passes beyond the detox.   Plus that he would not be getting those medications and substance abuse rehab programs that he need to stop seeking patient was apparently accepted in [place] [facility] program anticipated discharge tomorrow attending all the groups not suicidal not homicidal at and appetite sleep good good energy good motivation on the telephone working with staff attending all the groups continued inpatient level of care indicated thank you vital signs are stable normal chest and abdomen mom

## 2021-11-17 NOTE — BH NOTES
DISCHARGE SUMMARY    NAME:Vinnie Swanson  : 1970  MRN: [id number]    The patient Leanne Swanson exhibits the ability to control behavior in a less restrictive environment. Patient's level of functioning is improving. No assaultive/destructive behavior has been observed for the past 24 hours. No suicidal/homicidal threat or behavior has been observed for the past 24 hours. There is no evidence of serious medication side effects. Patient has not been in physical or protective restraints for at least the past 24 hours. If weapons involved, how are they secured? n/a    Is patient aware of and in agreement with discharge plan? ykes    Arrangements for medication:  Prescriptions given to patient, given a weeks supply or 30 day supply. Copy of discharge instructions to provider?:  yes    Arrangements for transportation home:  Pt will take medicaid cab to [facility]    Keep all follow up appointments as scheduled, continue to take prescribed medications per physician instructions.   Mental health crisis number:  802 or your local mental health crisis line number at [place] [address] Emergency WARM LINE      [phone] ([phone])      M-F: 9am to 9pm      Sat & Sun: 5pm  9pm  National suicide prevention lines:                             4-085-GILZURW (3-562-249-7223)       1-700-037-TALK ([phone])    Crisis Text Line:  Text HOME to 400969

## 2021-11-17 NOTE — BH NOTES
Behavioral Health Transition Record to Provider    Patient Name: Ziyad Swanson  YOB: 1970  Medical Record Number: [id number]  Date of Admission: 11/5/2021  Date of Discharge: 11.17.21    Attending Provider: Glenroy Moreland MD  Discharging Provider: Dr Kamryn Brown  To contact this individual call [phone] and ask the  to page. If unavailable, ask to be transferred to [address] [place] [address] Provider on call. [facility] Provider will be available on call 24/7 and during holidays. Primary Care Provider: None    No Known Allergies    Reason for Admission: PT was admitted for SI without a plan. Pt reported that he had been drinking increasingly    Admission Diagnosis: Major depression, recurrent ([facility] [address] [address] [F33.9]    * No surgery found *    Results for orders placed or performed during the hospital encounter of 11/05/21   SARS-COV-2   Result Value Ref Range    SARS-CoV-2 Not Detected Not Detected     CBC WITH AUTOMATED DIFF   Result Value Ref Range    WBC 6.0 4.1 - 11.1 K/uL    RBC 4.83 4.10 - 5.70 M/uL    HGB 16.4 12.1 - 17.0 g/dL    HCT 46.4 36.6 - 50.3 %    MCV 96.1 80.0 - 99.0 FL    MCH 34.0 26.0 - 34.0 PG    MCHC 35.3 30.0 - 36.5 g/dL    RDW 12.3 11.5 - 14.5 %    PLATELET 748 786 - 810 K/uL    MPV 9.1 8.9 - 12.9 FL    NRBC 0.0 0.0  WBC    ABSOLUTE NRBC 0.00 0.00 - 0.01 K/uL    NEUTROPHILS 45 32 - 75 %    LYMPHOCYTES 46 12 - 49 %    MONOCYTES 7 5 - 13 %    EOSINOPHILS 1 0 - 7 %    BASOPHILS 1 0 - 1 %    IMMATURE GRANULOCYTES 0 0 - 0.5 %    ABS. NEUTROPHILS 2.7 1.8 - 8.0 K/UL    ABS. LYMPHOCYTES 2.8 0.8 - 3.5 K/UL    ABS. MONOCYTES 0.4 0.0 - 1.0 K/UL    ABS. EOSINOPHILS 0.1 0.0 - 0.4 K/UL    ABS. BASOPHILS 0.1 0.0 - 0.1 K/UL    ABS. IMM.  GRANS. 0.0 0.00 - 0.04 K/UL    DF AUTOMATED     METABOLIC PANEL, COMPREHENSIVE   Result Value Ref Range    Sodium 140 136 - 145 mmol/L    Potassium 3.4 (L) 3.5 - 5.1 mmol/L    Chloride 103 97 - 108 mmol/L    CO2 27 21 - 32 mmol/L    Anion gap 10 5 - 15 mmol/L    Glucose 127 (H) 65 - 100 mg/dL    BUN 5 (L) 6 - 20 mg/dL    Creatinine 0.60 (L) 0.70 - 1.30 mg/dL    BUN/Creatinine ratio 8 (L) 12 - 20      GFR est AA >60 >60 ml/min/1.73m2    GFR est non-AA >60 >60 ml/min/1.73m2    Calcium 8.4 (L) 8.5 - 10.1 mg/dL    Bilirubin, total 0.3 0.2 - 1.0 mg/dL    AST (SGOT) 36 15 - 37 U/L    ALT (SGPT) 27 12 - 78 U/L    Alk. phosphatase 89 45 - 117 U/L    Protein, total 8.4 (H) 6.4 - 8.2 g/dL    Albumin 4.3 3.5 - 5.0 g/dL    Globulin 4.1 (H) 2.0 - 4.0 g/dL    A-G Ratio 1.0 (L) 1.1 - 2.2     ETHYL ALCOHOL   Result Value Ref Range    ALCOHOL(ETHYL),SERUM 355 (HH) <10 mg/dL   DRUG SCREEN, URINE   Result Value Ref Range    AMPHETAMINES Positive (A) Negative      BARBITURATES Negative Negative      BENZODIAZEPINES Negative Negative      COCAINE Negative Negative      METHADONE Negative Negative      OPIATES Negative Negative      PCP(PHENCYCLIDINE) Negative Negative      THC (TH-CANNABINOL) Negative Negative      Drug screen comment        This test is a screen for drugs of abuse in a medical setting only (i.e., they are unconfirmed results and as such must not be used for non-medical purposes, e.g.,employment testing, legal testing). Due to its inherent nature, false positive (FP) and false negative (FN) results may be obtained. Therefore, if necessary for medical care, recommend confirmation of positive findings by GC/MS.    URINALYSIS W/ REFLEX CULTURE    Specimen: Urine   Result Value Ref Range    Color Yellow/Straw      Appearance Clear Clear      Specific gravity 1.009 1.003 - 1.030      pH (UA) 6.0 5.0 - 8.0      Protein 30 (A) Negative mg/dL    Glucose Negative Negative mg/dL    Ketone 5 (A) Negative mg/dL    Bilirubin Negative Negative      Blood Small (A) Negative      Urobilinogen 0.1 0.1 - 1.0 EU/dL    Nitrites Negative Negative      Leukocyte Esterase Negative Negative      WBC 0-4 0 - 4 /hpf    RBC 0-5 0 - 5 /hpf    Bacteria Negative Negative /hpf    UA:UC IF INDICATED Culture not indicated by UA result Culture not indicated by UA result      Mucus Trace (A) Negative /lpf    Hyaline cast 0-2 0 - 5 /lpf   ETHYL ALCOHOL   Result Value Ref Range    ALCOHOL(ETHYL),SERUM 4 <10 mg/dL       Immunizations administered during this encounter: There is no immunization history on file for this patient. Screening for Metabolic Disorders for Patients on Antipsychotic Medications  (Data obtained from the EMR)    Estimated Body Mass Index  Estimated body mass index is 29.05 kg/m² as calculated from the following:    Height as of this encounter: 5' 6\" (1.676 m). Weight as of this encounter: 81.6 kg (180 lb). Vital Signs/Blood Pressure  Visit Vitals  BP (!) 130/90 (BP 1 Location: Right upper arm, BP Patient Position: Sitting)   Pulse 64   Temp 98.2 °F (36.8 °C)   Resp 18   Ht 5' 6\" (1.676 m)   Wt 81.6 kg (180 lb)   SpO2 100%   BMI 29.05 kg/m²       Blood Glucose/Hemoglobin A1c  Lab Results   Component Value Date/Time    Glucose 127 (H) 11/05/2021 04:45 PM       No results found for: HBA1C, [id number]     Lipid Panel  No results found for: CHOL, CHOLX, CHLST, CHOLV, 444757, HDL, HDLP, LDL, LDLC, DLDLP, TGLX, TRIGL, TRIGP, CHHD, CHHDX     Discharge Diagnosis: major depression     Discharge Plan: Pt is going to inpatient substance use treatment at [facility]    Discharge Medication List and Instructions:   Current Discharge Medication List      START taking these medications    Details   busPIRone (BUSPAR) 15 mg tablet Take 1 Tablet by mouth three (3) times daily. Qty: 90 Tablet, Refills: 0  Start date: 11/17/2021      buPROPion SR (WELLBUTRIN SR) 100 mg SR tablet Take 1 Tablet by mouth Daily (before breakfast). Qty: 30 Tablet, Refills: 0  Start date: 11/18/2021      ibuprofen (MOTRIN) 600 mg tablet Take 1 Tablet by mouth every six (6) hours as needed for Pain.   Qty: 30 Tablet, Refills: 0  Start date: 11/17/2021      nicotine (NICODERM CQ) 21 mg/24 hr 1 Patch by TransDERmal route daily for 30 days. Qty: 30 Patch, Refills: 0  Start date: 11/18/2021, End date: 12/18/2021      melatonin 3 mg tablet Take 1 Tablet by mouth nightly. Qty: 30 Tablet, Refills: 0  Start date: 11/17/2021      metoprolol succinate (TOPROL-XL) 25 mg XL tablet Take 1 Tablet by mouth daily. Qty: 30 Tablet, Refills: 0  Start date: 11/18/2021      amLODIPine (NORVASC) 2.5 mg tablet Take 1 Tablet by mouth daily. Qty: 30 Tablet, Refills: 0  Start date: 11/18/2021      pantoprazole (PROTONIX) 40 mg tablet Take 1 Tablet by mouth Daily (before breakfast). Qty: 30 Tablet, Refills: 0  Start date: 11/18/2021             Unresulted Labs (24h ago, onward)            None        To obtain results of studies pending at discharge, please contact [phone]    Follow-up Information     Follow up With Specialties Details Why [address] [place] [address]  Go on 11/17/2021 for substance use treatment [address] [place], [place] [address]  [phone]    None    None (395) Patient stated that they have no PCP            Advanced Directive:   Does the patient have an appointed surrogate decision maker? No  Does the patient have a Medical Advance Directive? No  Does the patient have a Psychiatric Advance Directive? No  If the patient does not have a surrogate or Medical Advance Directive AND Psychiatric Advance Directive, the patient was offered information on these advance directives Patient will complete at a later time    Patient Instructions: Please continue all medications until otherwise directed by physician. Tobacco Cessation Discharge Plan:   Is the patient a smoker and needs referral for smoking cessation? Not applicable  Patient referred to the following for smoking cessation with an appointment? Not applicable     Patient was offered medication to assist with smoking cessation at discharge? Not applicable  Was education for smoking cessation added to the discharge instructions?  Not applicable    Alcohol/Substance Abuse Discharge Plan:   Does the patient have a history of substance/alcohol abuse and requires a referral for treatment? Yes  Patient referred to the following for substance/alcohol abuse treatment with an appointment? Yes  Patient was offered medication to assist with alcohol cessation at discharge? Not applicable  Was education for substance/alcohol abuse added to discharge instructions?  Yes    Patient discharged to Inpatient facility; discussed with the receiving facility  plan for follow-up care

## 2021-11-17 NOTE — SUICIDE SAFETY PLAN
SAFETY PLAN    A suicide Safety Plan is a document that supports someone when they are having thoughts of suicide. Warning Signs that indicate a suicidal crisis may be developing: What (situations, thoughts, feelings, body sensations, behaviors, etc.) do you experience that lets you know you are beginning to think about suicide? 1. Racing thoughts   2. rage  3. hopelessness    Internal Coping Strategies:  What things can I do (relaxation techniques, hobbies, physical activities, etc.) to take my mind off my problems without contacting another person? 1. Working on projects  2. Cleaning/organizing  3. Exercising     People and social settings that provide distraction: Who can I call or where can I go to distract me? 1. Name: brother Cyril Rodriguez  Phone: per pt, number is in phone   2. Name:   Phone:    3. Place: fishing             4. Place: being outside     People whom I can ask for help: Who can I call when I need help - for example, friends, family, clergy, someone else? 1. Name: mum                Phone: per pt, number is in phone   2. Name: brother   Phone: per pt, number is in phone   3. Name: son  Phone: per pt, number is in phone     Professionals or [address] [place], [place] [address]vd I can contact during a crisis: Who can I call for help - for example, my doctor, my psychiatrist, my psychologist, a mental health provider, a suicide hotline? 1. Clinician Name: Britt mejía    Phone: [phone]      Clinician Pager or Emergency Contact #: [phone]    1. Clinician Name: Thomas Ramsay   Phone: [phone]      Clinician Pager or Emergency Contact #: [phone]    8. Suicide Prevention Lifeline: 6-215-200-TALK (3677)    4.  [address] [place] [address] Emergency Services -  for example, [facility] suicide hotline, [facility] Hotline: [facility]      Emergency Services Address: [address] [place] [address]      Emergency Services Phone: [phone]    Making the environment safe: How can I make my environment (house/apartment/living space) safer? For example, can I remove guns, medications, and other items? 1. Remove alcohol  2.  Remove weapons

## 2021-11-17 NOTE — BH NOTES
Medicaid Cab Trip Record Number: [id number]  Cab will stop at [id number] [facility] for pt to  prescriptions

## 2021-11-30 NOTE — DISCHARGE SUMMARY
Brissa Conrad [address] SUMMARY    Name:  Lynda Little  MR#:  [id number]  :  1970  ACCOUNT #:  [de-identified]  ADMIT DATE:  2021  DISCHARGE DATE:  2021    Please make reference to my initial psychiatric H and P. This is a 54-year-old single  male patient admitted to [address] [place] [address] voluntarily where he presented to the emergency department at [facility] for alcohol abuse dependence with drinking himself to death, he would rather kill himself rather than live this lifestyle. HISTORY OF PRESENT ILLNESS:  Long history of alcohol abuse, depression, lost job, lost house, alienated from the family, homeless, drinking a 12 pack of beer a day or a liter of liquor a day. This admission, alcohol level was 355. Poor appetite, poor sleep, poor motivation, low energy level, frustrated, suicidal thought and memory problems. This patient has prior hospitalization here, also had been to [address] [place] [address] via substance abuse rehab program.    COURSE AND TREATMENT DURING THE HOSPITALIZATION:  Started him on detoxification medications. He was also positive for methamphetamine and antidepressant medications for sleep. Apparently he could not take trazodone, so we put him on melatonin. He remained shaky, withdrawal for a few days and low energy level, sweaty. Slowly came around. Some get-go staff working with him for getting into substance abuse rehab program.  Staff reports that he was poorly motivated in the past but reinforced the need for rehab. While he was detoxing and getting treatment, neurovegetative symptoms of depression queried and antidepressant medications. They worked with him, several places, some issues with insurance or vacancy available. One came through for him to go to the [facility] [employer]. Medications were sent to [facility]. The patient needs to  medications from there. Cab too.     DISCHARGE MEDICATIONS: Buspirone 15 mg three times a day, bupropion SR one tablet daily, ibuprofen 600 mg p.r.n., nicotine 21 mg daily, melatonin 3 mg at night, metoprolol XL 25 mg daily, amlodipine 2.5 mg daily, pantoprazole 40 mg daily. ALLERGIES:  NO KNOWN ALLERGIES TO MEDICATIONS. PAST SURGICAL HISTORY:  No surgeries. LABORATORY DATA:  COVID not detected. CBC unremarkable. Blood chemistry:  Initial potassium was 3.4, glucose 127, and protein 8.4. Blood alcohol at the time of admission 355. Urine drug screen positive for amphetamines. Urinalysis:  Protein 30, ketones 5; otherwise unremarkable. VITAL SIGNS:  Height 5 feet 6 inches, weight 81.6 kg. Vital signs at the time of discharge, blood pressure 130/90, pulse 64, temperature 98.2, respirations 18, SpO2 of 100 on room air. DISCHARGE DIAGNOSES:  Major depression, recurrent, acute, severe without psychosis. Alcohol abuse. Alcohol dependence. Alcohol intoxication. Alcohol withdrawal syndrome. Hypertension. Gastroesophageal reflux disease. DISPOSITION:  Discharged as improved. Not suicidal.  Not homicidal.  No weapons.   Go into a substance abuse rehab inpatient program.  Upon discharge, follow up with regular psychiatrist.      Az Gutierrez MD      [id number]  D:  11/28/2021 19:51  T:  11/29/2021 15:12  JOB #:  [id number]

## 2022-01-01 ENCOUNTER — HOSPITAL ENCOUNTER (EMERGENCY)
Age: 52
End: 2022-01-13
Attending: EMERGENCY MEDICINE | Admitting: EMERGENCY MEDICINE
Payer: MEDICAID

## 2022-01-01 DIAGNOSIS — I46.9 CARDIAC ARREST (HCC): Primary | ICD-10-CM

## 2022-01-01 PROCEDURE — 74011250636 HC RX REV CODE- 250/636: Performed by: EMERGENCY MEDICINE

## 2022-01-01 PROCEDURE — 31500 INSERT EMERGENCY AIRWAY: CPT

## 2022-01-01 PROCEDURE — 99284 EMERGENCY DEPT VISIT MOD MDM: CPT

## 2022-01-01 PROCEDURE — 92950 HEART/LUNG RESUSCITATION CPR: CPT

## 2022-01-01 RX ORDER — EPINEPHRINE 0.1 MG/ML
INJECTION INTRACARDIAC; INTRAVENOUS
Status: COMPLETED | OUTPATIENT
Start: 2022-01-01 | End: 2022-01-01

## 2022-01-01 RX ADMIN — EPINEPHRINE 1 MG: 0.1 INJECTION, SOLUTION INTRAVENOUS at 23:20

## 2022-01-01 RX ADMIN — EPINEPHRINE 1 MG: 0.1 INJECTION, SOLUTION INTRAVENOUS at 23:22

## 2022-01-01 RX ADMIN — EPINEPHRINE 1 MG: 0.1 INJECTION, SOLUTION INTRAVENOUS at 23:26

## 2022-01-13 NOTE — PROGRESS NOTES
Spiritual Care Assessment/Progress Note  [facility]      NAME: Gloria Etienne      MRN: [id number]  AGE: 45 y.o. SEX: male  Restorationism Affiliation:    Language:      2022     Total Time (in minutes): 109     Spiritual Assessment begun in [address] [place] [address] DEPT through conversation with:         []Patient        [x] Family    [] Friend(s)        Reason for Consult: Code Blue/99     Spiritual beliefs: (Please include comment if needed)     [x] Identifies with a agustín tradition: Loren Alvarez         [] Supported by a agustín community:            [] Claims no spiritual orientation:           [] Seeking spiritual identity:                [] Adheres to an individual form of spirituality:           [] Not able to assess:                           Identified resources for coping:      [x] Prayer                               [] Music                  [] Guided Imagery     [x] Family/friends                 [] Pet visits     [] Devotional reading                         [] Unknown     [] Other:                                            Interventions offered during this visit: (See comments for more details)    Patient Interventions: Prayer (actual),Other (comment) (Silent support)     Family/Friend(s):  Affirmation of emotions/emotional suffering,Affirmation of agustín,Bridging,Catharsis/review of pertinent events in supportive environment,Coping skills reviewed/reinforced,Iconic (affirming the presence of God/Higher Power),Initial Assessment,/memorial planning,Life review/legacy,Normalization of emotional/spiritual concerns,Prayer (actual),Reframing,Restorationism beliefs/image of God discussed     Plan of Care:     [] Support spiritual and/or cultural needs    [] Support AMD and/or advance care planning process      [] Support grieving process   [] Coordinate Rites and/or Rituals    [] Coordination with community clergy   [] No spiritual needs identified at this time   [] Detailed Plan of Care below (See Comments)  [] Make referral to Music Therapy  [] Make referral to Pet Therapy     [] Make referral to Addiction services  [] Make referral to [facility]  [] Make referral to Spiritual Care Partner  [] No future visits requested        [x] Contact Spiritual Care for further referrals     Comments:  Visited patient in the ED unit for Code blue which resulted in death. Patient and several family members were present during the visit. They shared about events which led to patient's hospitalization along with his ongoing inner, medical and other life struggles. Shared about history of family and marital relationships. They seemed to process their emotions verbally and tearfully. Stated patient prayed regularly and hoped for his genuine [address] [facility]. Provided chaplaincy education, listened with empathy and reflection while offering grief support. Normalized their emotions and pain while facilitating storytelling and affirming their relationship bond and affection toward patient. Offered and provided prayer per their request and aadvised of  availability. Contact chaplains for further referrals. Chaplain Aris Nicolas M.Div.    can be reached by calling the  at [facility]  [phone]

## 2022-01-13 NOTE — ED TRIAGE NOTES
Pt found unresposive in a shed at brothers house. Cpr started by ems. Earl airway tried twice, Epi given X4. 1 Narcan and 1 sodium bicarb. IO to left leg.

## 2022-01-13 NOTE — ED PROVIDER NOTES
EMERGENCY DEPARTMENT HISTORY AND PHYSICAL EXAM        Date: 1/12/2022  Patient Name: Britni Peter    History of Presenting Illness     Chief Complaint   Patient presents with    Cardiac arrest       History Provided By: EMS    HPI: Britni Peter, 79 y.o. male with unknown medical problems who presents with cardiac arrest.  EMS reports the patient was found in a shed. He is suspected to be homeless. He had some warmth to his chest but no pulse. He was not breathing spontaneously. EMS started CPR and gave the patient 4 doses of epinephrine and a dose of Narcan. He was also given a dose of bicarb. Patient has been asystole throughout. C-collar was applied to the neck due to unknown if trauma related such as fall. PCP: No primary care provider on file. Past History     Past Medical History:  Unknown -unable to obtain due to patient in cardiac arrest    Past Surgical History:  Unknown -unable to obtain due to patient in cardiac arrest    Family History:  Unknown -unable to obtain due to patient in cardiac arrest    Social History:  Social History     Tobacco Use    Smoking status: Not on file    Smokeless tobacco: Not on file   Substance Use Topics    Alcohol use: Not on file    Drug use: Not on file       Allergies:  Not on File          Review of Systems   Review of Systems   Unable to perform ROS: Mental status change     Physical Exam   Constitutional: Ill appearing. Non-responsive. Skin: No rash. Pale. Cool to touch. ENT: C collar in place. Head is normocephalic and atraumatic. Eye: Pupils dilated bilaterally and fixed. Respiratory: No spontaneous respirations  Gastrointestinal: Mild abdominal distension. Musculoskeletal: No obvious bony deformities. Cardio: Pulseless. Poor peripheral perfusion. Diagnostic Study Results     Labs -   No results found for this or any previous visit (from the past 24 hour(s)).     Radiologic Studies -   No orders to display     CT Results  (Last 48 hours)    None        CXR Results  (Last 48 hours)    None          Medical Decision Making and ED Course     I reviewed the available vital signs, nursing notes, past medical history, past surgical history, family history, and social history. Vital Signs - Reviewed the patient's vital signs. No data found. Medical Decision Making:   Presented with cardiac arrests. The differential diagnosis is electrolyte abnormality, intracranial hemorrhage, arrhythmia, ACS, hypothermia. Unclear etiology of cause of cardiac arrest however he was in asystole for over 30 minutes during the resuscitation. Do suspect some level of hypothermia however I doubt that this was the cause of cardiac arrest.  Patient was given additional 2 doses of epinephrine 1 mg during resuscitation here. He was in asystole throughout. 4 5 rounds of CPR were completed. He was intubated by respiratory therapy and anesthesia. After multiple rounds of CPR the resuscitation was stopped after a total of about 35 to 40 minutes of ACLS protocol. Given patient was in asystole I do not believe patient would have had a reasonable outcome especially because of unknown downtime. Patient was in a warm room and was slowly warmed during the resuscitation. Disposition         Diagnosis     Clinical impression:   1. Cardiac arrest [facility])       Attestation:  Please note that this dictation was completed with [employer], the computer voice recognition software. Quite often unanticipated grammatical, syntax, homophones, and other interpretive errors are inadvertently transcribed by the computer software. Please disregard these errors. Please excuse any errors that have escaped final proofreading. Thank you.   Gwendolyn Callejas DO